# Patient Record
Sex: MALE | Race: WHITE | Employment: OTHER | URBAN - METROPOLITAN AREA
[De-identification: names, ages, dates, MRNs, and addresses within clinical notes are randomized per-mention and may not be internally consistent; named-entity substitution may affect disease eponyms.]

---

## 2017-09-22 ENCOUNTER — HOSPITAL ENCOUNTER (EMERGENCY)
Age: 77
Discharge: HOME | End: 2017-09-22
Attending: EMERGENCY MEDICINE
Payer: MEDICARE

## 2017-09-22 ENCOUNTER — APPOINTMENT (OUTPATIENT)
Dept: GENERAL RADIOLOGY | Age: 77
End: 2017-09-22
Payer: MEDICARE

## 2017-09-22 VITALS
OXYGEN SATURATION: 97 % | TEMPERATURE: 98.1 F | HEIGHT: 72 IN | BODY MASS INDEX: 24.46 KG/M2 | DIASTOLIC BLOOD PRESSURE: 92 MMHG | WEIGHT: 180.56 LBS | HEART RATE: 66 BPM | SYSTOLIC BLOOD PRESSURE: 155 MMHG | RESPIRATION RATE: 18 BRPM

## 2017-09-22 DIAGNOSIS — S81.801A LEG WOUND, RIGHT, INITIAL ENCOUNTER: Primary | ICD-10-CM

## 2017-09-22 PROCEDURE — 99283 EMERGENCY DEPT VISIT LOW MDM: CPT

## 2017-09-22 PROCEDURE — 74011250637 HC RX REV CODE- 250/637: Performed by: PHYSICIAN ASSISTANT

## 2017-09-22 PROCEDURE — 73590 X-RAY EXAM OF LOWER LEG: CPT

## 2017-09-22 RX ORDER — IBUPROFEN 400 MG/1
400 TABLET ORAL
Status: COMPLETED | OUTPATIENT
Start: 2017-09-22 | End: 2017-09-22

## 2017-09-22 RX ORDER — TAMSULOSIN HYDROCHLORIDE 0.4 MG/1
0.4 CAPSULE ORAL DAILY
COMMUNITY
End: 2022-06-16

## 2017-09-22 RX ADMIN — IBUPROFEN 400 MG: 400 TABLET, FILM COATED ORAL at 21:10

## 2017-09-23 NOTE — ED PROVIDER NOTES
HPI Comments: Trupti Dunn is a 78 y.o. male, pmhx significant for HTN, hiatal hernia, and glaucoma, who presents ambulatory with wife to the ED c/o right leg laceration sustained x 10:30am today. Pt was working with wood today when he dropped some on his leg and cut his right pre-tibial region. Pt washed the wound and sprayed antiseptic a couple hours after the accident once he arrived home. Pt states that he has normal sensation in his leg, and denies difficulty ambulating. [facility], and was referred to ED for ultrasound. Pt notes his last DTap was 3 years ago. Pt denies nausea, vomiting, hitting his head, LOC, headache, SOB, chest pain, or any other injuries. PCP: Eloy Moore MD    PSHx: Significant for orthopaedic (left TKR, cervical fusion, right foot surgery)  Social Hx: - tobacco, + EtOH, - Illicit Drugs    There are no other complaints, changes, or physical findings at this time. Trupti Dunn is a 78 y.o. male BIB wife for leg laceration. The history is provided by the patient. No  was used. Past Medical History:   Diagnosis Date    Gastrointestinal disorder     hiatal hernia    Glaucoma     Hypertension        Past Surgical History:   Procedure Laterality Date    HX ORTHOPAEDIC      left TKR    HX ORTHOPAEDIC      cervical fusion    HX ORTHOPAEDIC      right foot surgery         History reviewed. No pertinent family history. Social History     Social History    Marital status:      Spouse name: N/A    Number of children: N/A    Years of education: N/A     Occupational History    Not on file.      Social History Main Topics    Smoking status: Never Smoker    Smokeless tobacco: Never Used    Alcohol use Yes    Drug use: No    Sexual activity: Not on file     Other Topics Concern    Not on file     Social History Narrative         ALLERGIES: Review of patient's allergies indicates no known allergies. Review of Systems   Constitutional: Negative for activity change, appetite change, chills, fever and unexpected weight change. HENT: Negative for congestion. Eyes: Negative for pain and visual disturbance. Respiratory: Negative for cough. Gastrointestinal: Negative for diarrhea, nausea and vomiting. Genitourinary: Negative for dysuria. Musculoskeletal: Negative for back pain. Skin: Positive for wound. Negative for rash. Vitals:    09/22/17 2045   BP: (!) 155/92   Pulse: 66   Resp: 18   Temp: 98.1 °F (36.7 °C)   SpO2: 97%   Weight: 81.9 kg (180 lb 8.9 oz)   Height: 6' (1.829 m)            Physical Exam   Constitutional: He is oriented to person, place, and time. He appears well-developed and well-nourished. HENT:   Head: Normocephalic and atraumatic. Mouth/Throat: Oropharynx is clear and moist.   Eyes: Conjunctivae and EOM are normal. Pupils are equal, round, and reactive to light. Right eye exhibits no discharge. Left eye exhibits no discharge. Neck: Normal range of motion. Neck supple. Cardiovascular: Normal rate and normal heart sounds. No murmur heard. Pulmonary/Chest: Effort normal and breath sounds normal. No respiratory distress. He has no wheezes. He has no rales. Musculoskeletal: Normal range of motion. RLE mid pre-tibial region:  3cm horizontal abrasion slowly oozing blood without evidence of laceration with extensive pre-tibial edema and purplish skin discoloration  Normal TP and DP pulses with normal foot sensation, warmth, and coloration  No calf pain or swelling   Neurological: He is alert and oriented to person, place, and time. No cranial nerve deficit. He exhibits normal muscle tone. Skin: Skin is warm and dry. No rash noted. He is not diaphoretic. Nursing note and vitals reviewed.        MDM  Number of Diagnoses or Management Options  Leg wound, right, initial encounter:   Diagnosis management comments:   XR was without evidence of fracture, exam without evidence of compartment syndrome or DVT. Discussed strict return precautions, and home care management       Amount and/or Complexity of Data Reviewed  Tests in the radiology section of CPT®: ordered and reviewed  Review and summarize past medical records: yes    Patient Progress  Patient progress: stable    ED Course       Procedures    Progress Note:  10:44 PM  Discussed workup results/findings, and counseled pt regarding his diagnosis. Pt has been encouraged to follow-up as instructed. All questions have been answered, and pt conveyed understanding. Written by Monica Dodson ED Scribe, as dictated by Amada Long MD.    IMAGING RESULTS:  XR TIB/FIB RT   Final Result     EXAM:  XR TIB/FIB RT     INDICATION:  Trauma. Right lower leg pain     COMPARISON: None.     FINDINGS: AP and lateral  views of the right tibia and fibula demonstrate no  fracture.     IMPRESSION  IMPRESSION: No acute bony abnormality. MEDICATIONS GIVEN:  Medications   ibuprofen (MOTRIN) tablet 400 mg (400 mg Oral Given 9/22/17 2110)       IMPRESSION:  1. Leg wound, right, initial encounter        PLAN:  1. Discharge home. Discharge Medication List as of 9/22/2017 10:43 PM        2. Follow-up Information     Follow up With Details Comments Contact Info    [facility] EMERGENCY DEPT  If symptoms worsen with increased leg pain, numbness or skin color changes. [address] [place] [address]  [phone]        3. Return to ED if worse       DISCHARGE NOTE:  10:44 PM  Patient's results have been reviewed with them. Patient and/or family have verbally conveyed their understanding and agreement of the patient's signs, symptoms, diagnosis, treatment and prognosis and additionally agree to follow up as recommended or return to the Emergency Room should their condition change prior to follow-up.  Discharge instructions have also been provided to the patient with some educational information regarding their diagnosis as well a list of reasons why they would want to return to the ER prior to their follow-up appointment should their condition change. This note is prepared by Sweta Wilkins, acting as Scribe for MD Eduardo Birch MD: The scribe's documentation has been prepared under my direction and personally reviewed by me in its entirety. I confirm that the note above accurately reflects all work, treatment, procedures, and medical decision making performed by me.

## 2017-09-23 NOTE — ED NOTES
MD Bailey has reviewed discharge instructions with the patient. The patient verbalized understanding. Pt discharged with written instructions. No further concerns at this time. Pt ambulatory to exit with steady gait and ice pack provided.

## 2017-09-23 NOTE — ED NOTES
Pt arrives to the ED, referred from [facility] for possible compartment syndrome. Pt reports using log splitter, can not recall exact events. Log either bounced from ground or off splitter and hit right lower leg/shin. Pt ambulatory to room with steady gait. RLE: Pedal pulse present. Swelling centralized around middle of shin. Wife at bedside and call bell within reach.

## 2017-09-23 NOTE — DISCHARGE INSTRUCTIONS
Please stay off your leg with decreased activity over the weekend. You can ice the swelling for 20 minutes every a time every hour as needed for swelling. Cuts Left Open: Care Instructions  Your Care Instructions    A cut can happen anywhere on your body. Sometimes a cut can injure the tendons, blood vessels, or nerves. A cut may be left open instead of being closed with stitches, staples, or adhesive. A cut may be left open when it is likely to become infected, because closing it can make infection even more likely. You will probably have a bandage. The doctor may want the cut to stay open the whole time it heals. This happens with some cuts when too much time has gone by since the cut happened. Or the doctor may tell you to come back to have the cut closed in 4 to 5 days, when there is less chance of infection. If the cut stays open while healing, your scar may be larger than if the cut was closed. But you can get treatment later to make the scar smaller. The doctor has checked you carefully, but problems can develop later. If you notice any problems or new symptoms, get medical treatment right away. Follow-up care is a key part of your treatment and safety. Be sure to make and go to all appointments, and call your doctor if you are having problems. It's also a good idea to know your test results and keep a list of the medicines you take. How can you care for yourself at home? · Keep the cut dry for the first 24 to 48 hours. After this, you can shower if your doctor okays it. Pat the cut dry. · Don't soak the cut, such as in a bathtub. Your doctor will tell you when it's safe to get the cut wet. · If your doctor told you how to care for your cut, follow your doctor's instructions. If you did not get instructions, follow this general advice:  ¨ After the first 24 to 48 hours, wash the cut with clean water 2 times a day. Don't use hydrogen peroxide or alcohol, which can slow healing.   ¨ You may cover the cut with a thin layer of petroleum jelly, such as Vaseline, and a nonstick bandage. ¨ Apply more petroleum jelly and replace the bandage as needed. · Prop up the injured area on a pillow anytime you sit or lie down during the next 3 days. Try to keep it above the level of your heart. This will help reduce swelling. · Avoid any activity that could cause your cut to get worse. · Take pain medicines exactly as directed. ¨ If the doctor gave you a prescription medicine for pain, take it as prescribed. ¨ If you are not taking a prescription pain medicine, ask your doctor if you can take an over-the-counter medicine. When should you call for help? Call your doctor now or seek immediate medical care if:  · You have new pain, or your pain gets worse. · The cut starts to bleed, and blood soaks through the bandage. Oozing small amounts of blood is normal.  · The skin near the cut is cold or pale or changes color. · You have tingling, weakness, or numbness near the cut. · You have trouble moving the area near the cut. · You have symptoms of infection, such as:  ¨ Increased pain, swelling, warmth, or redness around the cut. ¨ Red streaks leading from the cut. ¨ Pus draining from the cut. ¨ A fever. Watch closely for changes in your health, and be sure to contact your doctor if:  · The cut is not closing (getting smaller). · You do not get better as expected. Where can you learn more? Go to http://juju-isabel.info/. Enter 20-23-41-52 in the search box to learn more about \"Cuts Left Open: Care Instructions. \"  Current as of: March 20, 2017  Content Version: 11.3  © 6686-9609 [employer]. Care instructions adapted under license by [employer] (which disclaims liability or warranty for this information).  If you have questions about a medical condition or this instruction, always ask your healthcare professional. [employer] disclaims any warranty or liability for your use of this information.

## 2019-04-20 ENCOUNTER — HOSPITAL ENCOUNTER (EMERGENCY)
Age: 79
Discharge: HOME | End: 2019-04-20
Attending: EMERGENCY MEDICINE | Admitting: EMERGENCY MEDICINE
Payer: MEDICARE

## 2019-04-20 VITALS
RESPIRATION RATE: 16 BRPM | OXYGEN SATURATION: 97 % | BODY MASS INDEX: 25.74 KG/M2 | DIASTOLIC BLOOD PRESSURE: 95 MMHG | HEIGHT: 72 IN | HEART RATE: 99 BPM | WEIGHT: 190.04 LBS | TEMPERATURE: 97.5 F | SYSTOLIC BLOOD PRESSURE: 145 MMHG

## 2019-04-20 DIAGNOSIS — R33.9 URINARY RETENTION: Primary | ICD-10-CM

## 2019-04-20 LAB
ALBUMIN SERPL-MCNC: 3.5 G/DL (ref 3.5–5)
ALBUMIN/GLOB SERPL: 1.3 {RATIO} (ref 1.1–2.2)
ALP SERPL-CCNC: 69 U/L (ref 45–117)
ALT SERPL-CCNC: 19 U/L (ref 12–78)
ANION GAP SERPL CALC-SCNC: 4 MMOL/L (ref 5–15)
APPEARANCE UR: CLEAR
AST SERPL-CCNC: 24 U/L (ref 15–37)
BACTERIA URNS QL MICRO: NEGATIVE /HPF
BASOPHILS # BLD: 0 K/UL (ref 0–0.1)
BASOPHILS NFR BLD: 0 % (ref 0–1)
BILIRUB SERPL-MCNC: 1 MG/DL (ref 0.2–1)
BILIRUB UR QL: NEGATIVE
BUN SERPL-MCNC: 17 MG/DL (ref 6–20)
BUN/CREAT SERPL: 17 (ref 12–20)
CALCIUM SERPL-MCNC: 8.7 MG/DL (ref 8.5–10.1)
CHLORIDE SERPL-SCNC: 106 MMOL/L (ref 97–108)
CO2 SERPL-SCNC: 28 MMOL/L (ref 21–32)
COLOR UR: ABNORMAL
CREAT SERPL-MCNC: 1.01 MG/DL (ref 0.7–1.3)
DIFFERENTIAL METHOD BLD: ABNORMAL
EOSINOPHIL # BLD: 0 K/UL (ref 0–0.4)
EOSINOPHIL NFR BLD: 0 % (ref 0–7)
EPITH CASTS URNS QL MICRO: ABNORMAL /LPF
ERYTHROCYTE [DISTWIDTH] IN BLOOD BY AUTOMATED COUNT: 14.1 % (ref 11.5–14.5)
GLOBULIN SER CALC-MCNC: 2.8 G/DL (ref 2–4)
GLUCOSE SERPL-MCNC: 118 MG/DL (ref 65–100)
GLUCOSE UR STRIP.AUTO-MCNC: NEGATIVE MG/DL
HCT VFR BLD AUTO: 35.7 % (ref 36.6–50.3)
HGB BLD-MCNC: 12.4 G/DL (ref 12.1–17)
HGB UR QL STRIP: ABNORMAL
HYALINE CASTS URNS QL MICRO: ABNORMAL /LPF (ref 0–5)
IMM GRANULOCYTES # BLD AUTO: 0 K/UL (ref 0–0.04)
IMM GRANULOCYTES NFR BLD AUTO: 0 % (ref 0–0.5)
KETONES UR QL STRIP.AUTO: NEGATIVE MG/DL
LEUKOCYTE ESTERASE UR QL STRIP.AUTO: NEGATIVE
LYMPHOCYTES # BLD: 0.4 K/UL (ref 0.8–3.5)
LYMPHOCYTES NFR BLD: 4 % (ref 12–49)
MCH RBC QN AUTO: 34.1 PG (ref 26–34)
MCHC RBC AUTO-ENTMCNC: 34.7 G/DL (ref 30–36.5)
MCV RBC AUTO: 98.1 FL (ref 80–99)
MONOCYTES # BLD: 1.7 K/UL (ref 0–1)
MONOCYTES NFR BLD: 15 % (ref 5–13)
NEUTS SEG # BLD: 9 K/UL (ref 1.8–8)
NEUTS SEG NFR BLD: 81 % (ref 32–75)
NITRITE UR QL STRIP.AUTO: NEGATIVE
NRBC # BLD: 0 K/UL (ref 0–0.01)
NRBC BLD-RTO: 0 PER 100 WBC
PH UR STRIP: 6 [PH] (ref 5–8)
PLATELET # BLD AUTO: 225 K/UL (ref 150–400)
PMV BLD AUTO: 9.7 FL (ref 8.9–12.9)
POTASSIUM SERPL-SCNC: 4.2 MMOL/L (ref 3.5–5.1)
PROT SERPL-MCNC: 6.3 G/DL (ref 6.4–8.2)
PROT UR STRIP-MCNC: NEGATIVE MG/DL
RBC # BLD AUTO: 3.64 M/UL (ref 4.1–5.7)
RBC #/AREA URNS HPF: ABNORMAL /HPF (ref 0–5)
RBC MORPH BLD: ABNORMAL
SODIUM SERPL-SCNC: 138 MMOL/L (ref 136–145)
SP GR UR REFRACTOMETRY: 1.01 (ref 1–1.03)
UA: UC IF INDICATED,UAUC: ABNORMAL
UROBILINOGEN UR QL STRIP.AUTO: 0.2 EU/DL (ref 0.2–1)
WBC # BLD AUTO: 11.1 K/UL (ref 4.1–11.1)
WBC URNS QL MICRO: ABNORMAL /HPF (ref 0–4)

## 2019-04-20 PROCEDURE — 85025 COMPLETE CBC W/AUTO DIFF WBC: CPT

## 2019-04-20 PROCEDURE — 77030005514 HC CATH URETH FOL14 BARD -A

## 2019-04-20 PROCEDURE — 36415 COLL VENOUS BLD VENIPUNCTURE: CPT

## 2019-04-20 PROCEDURE — 99282 EMERGENCY DEPT VISIT SF MDM: CPT

## 2019-04-20 PROCEDURE — 74011000250 HC RX REV CODE- 250: Performed by: EMERGENCY MEDICINE

## 2019-04-20 PROCEDURE — 81001 URINALYSIS AUTO W/SCOPE: CPT

## 2019-04-20 PROCEDURE — 80053 COMPREHEN METABOLIC PANEL: CPT

## 2019-04-20 PROCEDURE — 51702 INSERT TEMP BLADDER CATH: CPT

## 2019-04-20 RX ORDER — PILOCARPINE HYDROCHLORIDE 20 MG/ML
1 SOLUTION/ DROPS OPHTHALMIC 4 TIMES DAILY
COMMUNITY

## 2019-04-20 RX ORDER — LIDOCAINE HYDROCHLORIDE 20 MG/ML
JELLY TOPICAL
Status: COMPLETED | OUTPATIENT
Start: 2019-04-20 | End: 2019-04-20

## 2019-04-20 RX ORDER — LOTEPREDNOL ETABONATE 5 MG/ML
1 SUSPENSION/ DROPS OPHTHALMIC 4 TIMES DAILY
COMMUNITY

## 2019-04-20 RX ADMIN — LIDOCAINE HYDROCHLORIDE: 20 JELLY TOPICAL at 03:04

## 2019-04-20 NOTE — ED TRIAGE NOTES
Pt arrived with wife today. Pt having urinary retention post surgery today. Pt stated he put out 500cc in the recovery room. Pt has self-cathed twice since being home with little output. Pt reports having blood during self-cath.

## 2019-04-20 NOTE — ED NOTES
Pt had 1300cc of urine output in hayes. Pt reports feeling better. Hayes changed to leg bag. Pt and wife received instructions to usage.

## 2019-04-20 NOTE — DISCHARGE INSTRUCTIONS
Patient Education        Urinary Retention: Care Instructions  Your Care Instructions    Urinary retention means that you aren't able to urinate. In men, it is often caused by a blockage of the urinary tract from an enlarged prostate gland. In men and women, it can also be caused by an infection or nerve damage. Or it may be a side effect of a medicine. The doctor may have drained the urine from your bladder. If you still have problems passing urine, you may need to use a catheter at home. This is used to empty your bladder until the problem can be fixed. Your doctor may put a catheter in your bladder before you go home. If so, he or she will tell you when to come back to have the catheter removed. The doctor has checked you closely. But problems can develop later. If you notice any problems or new symptoms, get medical treatment right away. Follow-up care is a key part of your treatment and safety. Be sure to make and go to all appointments, and call your doctor if you are having problems. It's also a good idea to know your test results and keep a list of the medicines you take. How can you care for yourself at home? · Take your medicines exactly as prescribed. Call your doctor if you think you are having a problem with your medicine. You will get more details on the specific medicines your doctor prescribes. · Check with your doctor before you use any over-the-counter medicines. Many cold and allergy medicines, for example, can make this problem worse. Make sure your doctor knows all of the medicines, vitamins, supplements, and herbal remedies you take. · Spread out through the day the amount of fluid you drink. Do not drink a lot at bedtime. · Avoid alcohol and caffeine. · If you have been given a catheter, or if one is already in place, follow the instructions you were given. Always wash your hands before and after you handle the catheter. When should you call for help?   Call your doctor now or seek immediate medical care if:    · You cannot urinate at all, or it is getting harder to urinate.     · You have symptoms of a urinary tract infection. These may include:  ? Pain or burning when you urinate. ? A frequent need to urinate without being able to pass much urine. ? Pain in the flank, which is just below the rib cage and above the waist on either side of the back. ? Blood in your urine. ? A fever.    Watch closely for changes in your health, and be sure to contact your doctor if:    · You have any problems with your catheter.     · You do not get better as expected. Where can you learn more? Go to http://juju-isabel.info/. Enter M244 in the search box to learn more about \"Urinary Retention: Care Instructions. \"  Current as of: March 20, 2018  Content Version: 11.9  © 1215-0281 [employer]. Care instructions adapted under license by [employer] (which disclaims liability or warranty for this information). If you have questions about a medical condition or this instruction, always ask your healthcare professional. [place] [address] any warranty or liability for your use of this information. Learning About How to Care for a Person's Indwelling Urinary Catheter  Introduction    A urinary catheter is a flexible plastic tube used to drain urine from the bladder when a person cannot urinate. A doctor will place the catheter into the bladder by inserting it through the urethra. The urethra is the opening that carries urine from the bladder to the outside of the body. When the catheter is in the bladder, a small balloon is used to keep the catheter in place. The catheter lets urine drain from the bladder into a bag. The bag is usually attached to the thigh. Urinary catheters can be used in both men and women. A catheter that stays in place for a longer period of time is called an indwelling catheter.   A catheter may be needed because of certain medical conditions. These include an enlarged prostate or problems controlling urine. It may be used after surgery on the pelvis or urinary tract. Urinary catheters are also used when the lower part of the body is paralyzed. When helping a loved one with a catheter, try to be relaxed. Caring for a catheter can be embarrassing for both of you. This may be especially true if you are caring for someone of the opposite sex. If you are calm and don't seem embarrassed, the person may feel more comfortable. How do you take care of the catheter? Wear disposable gloves when handling someone's catheter. Make sure to follow all of the instructions the doctor has given. And always wash your hands before and after you're done. Here are some other things to remember when caring for someone's catheter:  · Make sure that urine is running out of the catheter into the urine collection bag. And make sure that the catheter tubing does not get twisted or bent. · Keep the urine collection bag below the level of the bladder. At night it may be helpful to hang the bag on the side of the bed. · Make sure that the urine collection bag does not drag and pull on the catheter. · It is okay to shower with a catheter and urine collection bag in place, unless the doctor says not to. · Check for swelling or signs of infection in the area around the catheter. Signs of infection include pus or irritated, swollen, red, or tender skin. · Clean the area around the catheter twice a day with soap and water. Dry with a clean towel afterward. · Do not apply powder or lotion to the skin around the catheter. · Do not tug or pull on the catheter. · Sexual intercourse may still be possible for individuals who wear a catheter. It is best to talk with a doctor about options. How do you empty the bag? The urine collection bag needs to be emptied regularly. It is best to empty the bag when it's about half full or at bedtime.  If the doctor has asked you to measure the amount of urine, do that before you empty the urine into the toilet. When you are ready to empty the bag, follow these steps:  1. Put on disposable gloves. 2. Remove the drain spout from its sleeve at the bottom of the collection bag. Open the valve on the spout. 3. Let the urine flow out of the bag and into the toilet or a container. Do not let the tubing or drain spout touch anything. 4. After you empty the bag, close the valve and put the drain spout back into its sleeve. 5. Remove your gloves and throw them away. 6. Wash your hands with soap and water. How do you care for someone after the catheter is removed? After the catheter is taken out, the person may have trouble urinating. If this happens, try helping them sit in a few inches of warm water (sitz bath). If the urge to urinate comes during the sitz bath, it may be easier for them to urinate while still in the bath. Some burning may happen the first few times the person urinates. If the burning lasts longer, it may be a sign of an infection. If the catheter causes irritation or a rash, wearing loose, cotton underwear may help. Watch closely for changes in the person's health, and be sure to contact their doctor if you notice any problems. Where can you learn more? Go to http://juju-isabel.info/. Enter U479 in the search box to learn more about \"Learning About How to Care for a Person's Indwelling Urinary Catheter. \"  Current as of: April 18, 2018  Content Version: 11.9  © 0811-7029 [employer]. Care instructions adapted under license by [employer] (which disclaims liability or warranty for this information).  If you have questions about a medical condition or this instruction, always ask your healthcare professional. [address] any warranty or liability for your use of this information.

## 2019-04-20 NOTE — ED NOTES
Patient discharged by America Blanchard MD. Patient provided with discharge instructions Rx and instructions on follow up care. Patient out of ED ambulatory accompanied by wife.

## 2019-04-20 NOTE — ED PROVIDER NOTES
EMERGENCY DEPARTMENT HISTORY AND PHYSICAL EXAM  
     
 
Date: 4/20/2019 Patient Name: Dinah Schneider History of Presenting Illness Chief Complaint Patient presents with  Urinary Retention  
  urinated 200 mL approx 2200; had sx at [facility] yesterday; has had retention in the past  
 
 
History Provided By: Patient and wife HPI: Dinah Schneider is a 70 y.o. male, pmhx of intermittent urinary retention, enlarged prostate, who presents ambulatory to the ED with c/o urinary retention since 10pm. Pt notes he attempted to self cath at home with little to no results on 2 attempts. Patient notes diffuse lower abdominal distention, pressure and pain. Patient denies having any fever, nausea/vomiting/diarrhea/constipation chest pain/shortness of breath. Patient is status post left rotator cuff repair from yesterday morning. Patient noted to have regional block per anesthesia for the procedure. Patient followed by Dr. Donell Fregoso of [facility]. Pt specifically denies any recent fevers, chills, nausea, vomiting, diarrhea, CP, SOB, changes in BM, or headache. PCP: Zoila Chandra MD 
 
Social Hx: denies tobacco, denies EtOH, denies Illicit Drugs There are no other complaints, changes, or physical findings at this time. No Known Allergies Current Outpatient Medications Medication Sig Dispense Refill  loteprednol etabonate (LOTEMAX) 0.5 % ophthalmic suspension Administer 1 Drop to both eyes four (4) times daily.  pilocarpine (PILOCAR) 2 % ophthalmic solution Administer 1 Drop to both eyes four (4) times daily.  tamsulosin (FLOMAX) 0.4 mg capsule Take 0.4 mg by mouth daily.  lisinopril (PRINIVIL, ZESTRIL) 40 mg tablet Take 40 mg by mouth daily.  dutaseride (AVODART) 0.5 mg capsule Take 0.5 mg by mouth daily.  BRIMONIDINE TARTRATE (ALPHAGAN P OP) Apply  to eye.  doxepin (SINEQUAN) 10 mg capsule Take  by mouth nightly. Past History Past Medical History: 
Past Medical History:  
Diagnosis Date  Gastrointestinal disorder   
 hiatal hernia  Glaucoma  Hypertension Past Surgical History: 
Past Surgical History:  
Procedure Laterality Date  HX ORTHOPAEDIC    
 left TKR  HX ORTHOPAEDIC    
 cervical fusion  HX ORTHOPAEDIC    
 right foot surgery Family History: 
History reviewed. No pertinent family history. Social History: 
Social History Tobacco Use  Smoking status: Never Smoker  Smokeless tobacco: Never Used Substance Use Topics  Alcohol use: Yes  Drug use: No  
 
 
Allergies: 
No Known Allergies Review of Systems Review of Systems Constitutional: Negative for chills and fever. HENT: Negative. Eyes: Negative. Respiratory: Negative for cough, chest tightness and shortness of breath. Cardiovascular: Negative for chest pain and leg swelling. Gastrointestinal: Positive for abdominal pain. Negative for diarrhea, nausea and vomiting. Endocrine: Negative. Genitourinary: Positive for decreased urine volume, difficulty urinating and dysuria. Musculoskeletal: Negative for myalgias. Skin: Negative. Neurological: Negative. Psychiatric/Behavioral: Negative. All other systems reviewed and are negative. Physical Exam  
Physical Exam  
Constitutional: He is oriented to person, place, and time. He appears well-developed and well-nourished. He appears distressed. HENT:  
Head: Normocephalic and atraumatic. Nose: Nose normal.  
Mouth/Throat: No oropharyngeal exudate. Eyes: Pupils are equal, round, and reactive to light. Conjunctivae and EOM are normal.  
Neck: Normal range of motion. Neck supple. No JVD present. Cardiovascular: Normal rate, regular rhythm, normal heart sounds and intact distal pulses. Exam reveals no friction rub. No murmur heard. Pulmonary/Chest: Effort normal and breath sounds normal. No stridor.  No respiratory distress. He has no wheezes. He has no rales. Abdominal: Soft. Bowel sounds are normal. He exhibits no distension. There is tenderness in the suprapubic area. There is no rigidity, no rebound and no guarding. Musculoskeletal: Normal range of motion. He exhibits no tenderness. Neurological: He is alert and oriented to person, place, and time. No cranial nerve deficit. Skin: Skin is warm and dry. No rash noted. He is not diaphoretic. Psychiatric: He has a normal mood and affect. His speech is normal and behavior is normal. Judgment and thought content normal. Cognition and memory are normal.  
Nursing note and vitals reviewed. Diagnostic Study Results Labs - Recent Results (from the past 12 hour(s)) URINALYSIS W/ REFLEX CULTURE Collection Time: 04/20/19  3:35 AM  
Result Value Ref Range Color YELLOW/STRAW Appearance CLEAR CLEAR Specific gravity 1.013 1.003 - 1.030    
 pH (UA) 6.0 5.0 - 8.0 Protein NEGATIVE  NEG mg/dL Glucose NEGATIVE  NEG mg/dL Ketone NEGATIVE  NEG mg/dL Bilirubin NEGATIVE  NEG Blood MODERATE (A) NEG Urobilinogen 0.2 0.2 - 1.0 EU/dL Nitrites NEGATIVE  NEG Leukocyte Esterase NEGATIVE  NEG    
 WBC 0-4 0 - 4 /hpf  
 RBC 20-50 0 - 5 /hpf Epithelial cells FEW FEW /lpf Bacteria NEGATIVE  NEG /hpf  
 UA:UC IF INDICATED CULTURE NOT INDICATED BY UA RESULT CNI Hyaline cast 0-2 0 - 5 /lpf METABOLIC PANEL, COMPREHENSIVE Collection Time: 04/20/19  3:35 AM  
Result Value Ref Range Sodium 138 136 - 145 mmol/L Potassium 4.2 3.5 - 5.1 mmol/L Chloride 106 97 - 108 mmol/L  
 CO2 28 21 - 32 mmol/L Anion gap 4 (L) 5 - 15 mmol/L Glucose 118 (H) 65 - 100 mg/dL BUN 17 6 - 20 MG/DL Creatinine 1.01 0.70 - 1.30 MG/DL  
 BUN/Creatinine ratio 17 12 - 20 GFR est AA >60 >60 ml/min/1.73m2 GFR est non-AA >60 >60 ml/min/1.73m2 Calcium 8.7 8.5 - 10.1 MG/DL  Bilirubin, total 1.0 0.2 - 1.0 MG/DL  
 ALT (SGPT) 19 12 - 78 U/L  
 AST (SGOT) 24 15 - 37 U/L Alk. phosphatase 69 45 - 117 U/L Protein, total 6.3 (L) 6.4 - 8.2 g/dL Albumin 3.5 3.5 - 5.0 g/dL Globulin 2.8 2.0 - 4.0 g/dL A-G Ratio 1.3 1.1 - 2.2    
CBC WITH AUTOMATED DIFF Collection Time: 04/20/19  3:35 AM  
Result Value Ref Range WBC 11.1 4.1 - 11.1 K/uL  
 RBC 3.64 (L) 4.10 - 5.70 M/uL  
 HGB 12.4 12.1 - 17.0 g/dL HCT 35.7 (L) 36.6 - 50.3 % MCV 98.1 80.0 - 99.0 FL  
 MCH 34.1 (H) 26.0 - 34.0 PG  
 MCHC 34.7 30.0 - 36.5 g/dL  
 RDW 14.1 11.5 - 14.5 % PLATELET 743 540 - 944 K/uL MPV 9.7 8.9 - 12.9 FL  
 NRBC 0.0 0  WBC ABSOLUTE NRBC 0.00 0.00 - 0.01 K/uL NEUTROPHILS 81 (H) 32 - 75 % LYMPHOCYTES 4 (L) 12 - 49 % MONOCYTES 15 (H) 5 - 13 % EOSINOPHILS 0 0 - 7 % BASOPHILS 0 0 - 1 % IMMATURE GRANULOCYTES 0 0.0 - 0.5 % ABS. NEUTROPHILS 9.0 (H) 1.8 - 8.0 K/UL  
 ABS. LYMPHOCYTES 0.4 (L) 0.8 - 3.5 K/UL  
 ABS. MONOCYTES 1.7 (H) 0.0 - 1.0 K/UL  
 ABS. EOSINOPHILS 0.0 0.0 - 0.4 K/UL  
 ABS. BASOPHILS 0.0 0.0 - 0.1 K/UL  
 ABS. IMM. GRANS. 0.0 0.00 - 0.04 K/UL  
 DF SMEAR SCANNED    
 RBC COMMENTS NORMOCYTIC, NORMOCHROMIC Radiologic Studies - No orders to display CT Results  (Last 48 hours) None CXR Results  (Last 48 hours) None Medical Decision Making I am the first provider for this patient. I reviewed the vital signs, available nursing notes, past medical history, past surgical history, family history and social history. Vital Signs-Reviewed the patient's vital signs. Patient Vitals for the past 12 hrs: 
 Temp Pulse Resp BP SpO2  
04/20/19 0235 97.5 °F (36.4 °C) 99 16 (!) 145/95 97 % Pulse Oximetry Analysis - 97% on RA Records Reviewed: Nursing Notes and Old Medical Records Provider Notes (Medical Decision Making): DDX: 
Urinary retention, enlarged prostate, UTI, renal insufficiency Plan: 
Bladder scan, UA, Hughes placement Impression: 
Urinary obstruction and retention ED Course:  
Initial assessment performed. The patients presenting problems have been discussed, and they are in agreement with the care plan formulated and outlined with them. I have encouraged them to ask questions as they arise throughout their visit. I reviewed our electronic medical record system for any past medical records that were available that may contribute to the patients current condition, the nursing notes and and vital signs from today's visit Nursing notes will be reviewed as they become available in realtime while the pt has been in the ED. Reena Hickman MD 
 
 
PROGRESS NOTE 
4:02 AM 
Pt noted to have 900+ cc in his bladder at time of initial bladder scan. Nursing has placed indwelling Hughes and noted to have initial output of 700 cc. We will continue to allow drainage and lab results to ensure kidney function intact. 8013 Progress note: 
Pt noted to be feeling better after draining more than 1L of urine, feels ready for discharge. Discussed lab findings with pt and/or family, specifically noting normal labs. Pt will follow up with Dr. Jean Newby as instructed. All questions have been answered, pt voiced understanding and agreement with plan. If narcotics were prescribed, pt's  record was reviewed and pt was advised not to drive or operate heavy machinery. If abx were prescribed, pt advised that diarrhea and rash are possible side effects of the medications. Specific return precautions provided in addition to instructions for pt to return to the ED immediately should sx worsen at any time. Reena Hickman MD 
 
 
Critical Care Time:  
 
none Diagnosis Clinical Impression: 1. Urinary retention PLAN: 
1. Discharge Medication List as of 4/20/2019  4:34 AM  
  
 
2. Follow-up Information Follow up With Specialties Details Why Contact Info Sophia Devi MD Urology Schedule an appointment as soon as possible for a visit in 2 days  [address] [facility] [place] 
[phone] Return to ED if worse Disposition: 
[id number] The patient's results have been reviewed with family and/or caregiver. They verbally convey their understanding and agreement of the patient's signs, symptoms, diagnosis, treatment and prognosis and additionally agree to follow up as recommended in the discharge instructions or to return to the Emergency Room should the patient's condition change prior to their follow-up appointment. The family and/or caregiver verbally agrees with the care-plan and all of their questions have been answered. The discharge instructions have also been provided to the them with educational information regarding the patient's diagnosis as well a list of reasons why the patient would want to return to the ER prior to their follow-up appointment should their condition change. Isamar Rowland MD 
 
 
 
 
 
Please note that this dictation was completed with TrialBee, the computer voice recognition software. Quite often unanticipated grammatical, syntax, homophones, and other interpretive errors are inadvertently transcribed by the computer software. Please disregard these errors. Please excuse any errors that have escaped final proofreading. This note will not be viewable in [address]

## 2020-04-10 ENCOUNTER — HOSPITAL ENCOUNTER (OUTPATIENT)
Dept: CT IMAGING | Age: 80
Discharge: HOME | End: 2020-04-10
Attending: SPECIALIST
Payer: MEDICARE

## 2020-04-10 DIAGNOSIS — R19.4 CHANGE IN BOWEL HABITS: ICD-10-CM

## 2020-04-10 DIAGNOSIS — R10.31 RIGHT LOWER QUADRANT PAIN: ICD-10-CM

## 2020-04-10 DIAGNOSIS — R10.32 LEFT LOWER QUADRANT PAIN: ICD-10-CM

## 2020-04-10 LAB — CREAT BLD-MCNC: 1.1 MG/DL (ref 0.6–1.3)

## 2020-04-10 PROCEDURE — 82565 ASSAY OF CREATININE: CPT

## 2020-04-10 PROCEDURE — 74011000255 HC RX REV CODE- 255: Performed by: SPECIALIST

## 2020-04-10 PROCEDURE — 74011636320 HC RX REV CODE- 636/320: Performed by: SPECIALIST

## 2020-04-10 PROCEDURE — 74177 CT ABD & PELVIS W/CONTRAST: CPT

## 2020-04-10 RX ORDER — BARIUM SULFATE 20 MG/ML
900 SUSPENSION ORAL
Status: COMPLETED | OUTPATIENT
Start: 2020-04-10 | End: 2020-04-10

## 2020-04-10 RX ORDER — SODIUM CHLORIDE 0.9 % (FLUSH) 0.9 %
10 SYRINGE (ML) INJECTION
Status: COMPLETED | OUTPATIENT
Start: 2020-04-10 | End: 2020-04-10

## 2020-04-10 RX ADMIN — BARIUM SULFATE 900 ML: 21 SUSPENSION ORAL at 07:50

## 2020-04-10 RX ADMIN — Medication 10 ML: at 07:50

## 2020-04-10 RX ADMIN — IOPAMIDOL 100 ML: 755 INJECTION, SOLUTION INTRAVENOUS at 07:50

## 2020-04-18 ENCOUNTER — HOSPITAL ENCOUNTER (EMERGENCY)
Age: 80
Discharge: HOME | End: 2020-04-18
Attending: EMERGENCY MEDICINE | Admitting: EMERGENCY MEDICINE
Payer: MEDICARE

## 2020-04-18 ENCOUNTER — APPOINTMENT (OUTPATIENT)
Dept: GENERAL RADIOLOGY | Age: 80
End: 2020-04-18
Attending: EMERGENCY MEDICINE
Payer: MEDICARE

## 2020-04-18 VITALS
DIASTOLIC BLOOD PRESSURE: 80 MMHG | OXYGEN SATURATION: 96 % | HEART RATE: 79 BPM | RESPIRATION RATE: 18 BRPM | TEMPERATURE: 98.7 F | SYSTOLIC BLOOD PRESSURE: 157 MMHG

## 2020-04-18 DIAGNOSIS — S42.125A CLOSED NONDISPLACED FRACTURE OF LEFT ACROMIAL PROCESS, INITIAL ENCOUNTER: Primary | ICD-10-CM

## 2020-04-18 PROCEDURE — 74011250637 HC RX REV CODE- 250/637: Performed by: EMERGENCY MEDICINE

## 2020-04-18 PROCEDURE — 73030 X-RAY EXAM OF SHOULDER: CPT

## 2020-04-18 PROCEDURE — 99283 EMERGENCY DEPT VISIT LOW MDM: CPT

## 2020-04-18 RX ORDER — HYDROCODONE BITARTRATE AND ACETAMINOPHEN 5; 325 MG/1; MG/1
1 TABLET ORAL ONCE
Status: COMPLETED | OUTPATIENT
Start: 2020-04-18 | End: 2020-04-18

## 2020-04-18 RX ORDER — HYDROCODONE BITARTRATE AND ACETAMINOPHEN 5; 325 MG/1; MG/1
1 TABLET ORAL
Qty: 8 TAB | Refills: 0 | Status: SHIPPED | OUTPATIENT
Start: 2020-04-18 | End: 2020-04-21

## 2020-04-18 RX ADMIN — HYDROCODONE BITARTRATE AND ACETAMINOPHEN 1 TABLET: 5; 325 TABLET ORAL at 16:07

## 2020-04-18 NOTE — ED NOTES
Pt wishes to use sling he brought from home. RN obtained sling from wife in parking lot and applied to pt's L shoulder and arm.

## 2020-04-18 NOTE — ED NOTES
Liliya SNOW reviewed discharge instructions with the patient and wife in parking lot. The patient and wife verbalized understanding. All questions and concerns were addressed. The patient declined a wheelchair and is discharged ambulatory in the care of family members with instructions and prescriptions in hand. Pt is alert and oriented x 4. Respirations are clear and unlabored.

## 2020-04-18 NOTE — DISCHARGE INSTRUCTIONS
Patient Education        Shoulder Blade Fracture: Care Instructions  Your Care Instructions    The shoulder blade (scapula) is a triangle-shaped bone in the upper back. It connects to the upper arm bone (humerus) and to the collar bone (clavicle). Shoulder blade fractures are usually caused by high-impact accidents such as motorcycle or car crashes or very hard falls. Treatment for these fractures is usually a sling or other device that supports the shoulder while the bone heals. Most fractures heal completely in about six weeks, but it can take six months to a year for your shoulder motion to return to normal. Sometimes, full motion doesn't return. Some types of shoulder blade fractures may need surgery. The doctor has checked you carefully, but problems can develop later. If you notice any problems or new symptoms, get medical treatment right away. Follow-up care is a key part of your treatment and safety. Be sure to make and go to all appointments, and call your doctor if you are having problems. It's also a good idea to know your test results and keep a list of the medicines you take. How can you care for yourself at home? · Wear the sling for as long as your doctor tells you to. You may take off the sling when you bathe. When the sling is off, avoid arm positions or motions that cause or increase pain. · Put ice or a cold pack on your shoulder for 10 to 20 minutes at a time. Try to do this every 1 to 2 hours for the next 3 days (when you are awake) or until the swelling goes down. Put a thin cloth between the ice and your skin. · After a few days, put your fingers, wrist, and elbow through their full range of motion several times a day. This will keep them from getting stiff. You may get instructions on rehabilitation exercises you can do when your shoulder starts to heal.  · Be safe with medicines. Read and follow all instructions on the label.   ? If the doctor gave you a prescription medicine for pain, take it as prescribed. ? If you are not taking a prescription pain medicine, ask your doctor if you can take an over-the-counter medicine. When should you call for help? Call 911 anytime you think you may need emergency care. For example, call if:    · You are having severe trouble breathing.     · You have chest pain.    Call your doctor now or seek immediate medical care if:    · You have new or worse symptoms in your arm. Symptoms may include:  ? Numbness or tingling. ? Weakness. ? Pain. ? New or worse swelling.    Watch closely for changes in your health, and be sure to contact your doctor if:    · You do not get better as expected. Where can you learn more? Go to http://juju-isabel.info/  Enter D119 in the search box to learn more about \"Shoulder Blade Fracture: Care Instructions. \"  Current as of: June 26, 2019Content Version: 12.4  © 6949-5266 Healthwise, Incorporated. Care instructions adapted under license by [employer] (which disclaims liability or warranty for this information). If you have questions about a medical condition or this instruction, always ask your healthcare professional. [place] [address] any warranty or liability for your use of this information.

## 2020-04-18 NOTE — ED PROVIDER NOTES
EMERGENCY DEPARTMENT HISTORY AND PHYSICAL EXAM      Date: 4/18/2020  Patient Name: Kvng Croft    History of Presenting Illness     Chief Complaint   Patient presents with    Shoulder Pain     The patient presents to the ED with complaints of left shoulder pain after falling while chopping wood. History Provided By: Patient    HPI: Kvng Croft, 68 y.o. male with PMHx significant for hypertension who presents with a chief complaint of left shoulder pain. Patient states that he was cutting wood with a chainsaw and stepped on a piece causing him to lose his balance and fall backwards landing directly on the back of his left shoulder. States he had immediate pain in that area. Denies any numbness or tingling distal to the injury and denies any weakness in the hand or wrist.  Denies loss of consciousness, headache, nausea, vomiting. PCP: Miles Stewart MD    There are no other complaints, changes, or physical findings at this time. Current Outpatient Medications   Medication Sig Dispense Refill    HYDROcodone-acetaminophen (Norco) 5-325 mg per tablet Take 1 Tab by mouth every four (4) hours as needed for Pain for up to 3 days. Max Daily Amount: 6 Tabs. 8 Tab 0    loteprednol etabonate (LOTEMAX) 0.5 % ophthalmic suspension Administer 1 Drop to both eyes four (4) times daily.  pilocarpine (PILOCAR) 2 % ophthalmic solution Administer 1 Drop to both eyes four (4) times daily.  tamsulosin (FLOMAX) 0.4 mg capsule Take 0.4 mg by mouth daily.  lisinopril (PRINIVIL, ZESTRIL) 40 mg tablet Take 40 mg by mouth daily.  dutaseride (AVODART) 0.5 mg capsule Take 0.5 mg by mouth daily.  BRIMONIDINE TARTRATE (ALPHAGAN P OP) Apply  to eye.  doxepin (SINEQUAN) 10 mg capsule Take  by mouth nightly.        Past History     Past Medical History:  Past Medical History:   Diagnosis Date    Gastrointestinal disorder     hiatal hernia    Glaucoma     Hypertension      Past Surgical History:  Past Surgical History:   Procedure Laterality Date    HX ORTHOPAEDIC      left TKR    HX ORTHOPAEDIC      cervical fusion    HX ORTHOPAEDIC      right foot surgery     Family History:  No family history on file. Social History:  Social History     Tobacco Use    Smoking status: Never Smoker    Smokeless tobacco: Never Used   Substance Use Topics    Alcohol use: Yes    Drug use: No     Allergies:  No Known Allergies  Review of Systems   Review of Systems   Constitutional: Negative for chills and fever. HENT: Negative for congestion, rhinorrhea and sore throat. Respiratory: Negative for cough and shortness of breath. Cardiovascular: Negative for chest pain. Gastrointestinal: Negative for abdominal pain, nausea and vomiting. Genitourinary: Negative for dysuria and urgency. Musculoskeletal: Positive for arthralgias. Skin: Negative for rash. Neurological: Negative for dizziness, light-headedness and headaches. All other systems reviewed and are negative. Physical Exam   Physical Exam  Vitals signs and nursing note reviewed. Constitutional:       General: He is not in acute distress. Appearance: He is well-developed. HENT:      Head: Normocephalic and atraumatic. Eyes:      Conjunctiva/sclera: Conjunctivae normal.      Pupils: Pupils are equal, round, and reactive to light. Neck:      Musculoskeletal: Normal range of motion. Cardiovascular:      Rate and Rhythm: Normal rate and regular rhythm. Pulmonary:      Effort: Pulmonary effort is normal. No respiratory distress. Breath sounds: Normal breath sounds. No stridor. Abdominal:      General: There is no distension. Palpations: Abdomen is soft. Tenderness: There is no abdominal tenderness. Musculoskeletal: Normal range of motion. Skin:     General: Skin is warm and dry. Findings: Abrasion (over left posterior shoulder) present.           Neurological:      Mental Status: He is alert and oriented to person, place, and time. Diagnostic Study Results   Labs -   No results found for this or any previous visit (from the past 12 hour(s)). Radiologic Studies -   XR SHOULDER LT AP/LAT MIN 2 V   Final Result   IMPRESSION:    1. Acute nondisplaced acromial fracture. Xr Shoulder Lt Ap/lat Min 2 V    Result Date: 4/18/2020  IMPRESSION: 1. Acute nondisplaced acromial fracture. Medical Decision Making   I am the first provider for this patient. I reviewed the vital signs, available nursing notes, past medical history, past surgical history, family history and social history. Vital Signs-Reviewed the patient's vital signs. Patient Vitals for the past 12 hrs:   Temp Pulse Resp BP SpO2   04/18/20 1619    157/80 96 %   04/18/20 1515     98 %   04/18/20 1504 98.7 °F (37.1 °C) 79 18 155/77 97 %       Pulse Oximetry Analysis - 98% on ra      Records Reviewed: Nursing Notes and Old Medical Records    Provider Notes (Medical Decision Making):   Ddx: shoulder fx, dislocation, contusion    Plan for xray, reeval    ED Course:   Initial assessment performed. The patients presenting problems have been discussed, and they are in agreement with the care plan formulated and outlined with them. I have encouraged them to ask questions as they arise throughout their visit. xray with acromial fx  Patient placed in his own sling  Advised follow up with ortho  Return precautions discussed    Critical Care:  none    Disposition:  Discharge Note:  4:35 PM  The patient has been re-evaluated and is ready for discharge. Reviewed available results with patient. Counseled patient on diagnosis and care plan. Patient has expressed understanding, and all questions have been answered. Patient agrees with plan and agrees to follow up as recommended, or to return to the ED if their symptoms worsen.  Discharge instructions have been provided and explained to the patient, along with reasons to return to the ED.       PLAN:  1. Discharge Medication List as of 4/18/2020  4:35 PM      START taking these medications    Details   HYDROcodone-acetaminophen (Norco) 5-325 mg per tablet Take 1 Tab by mouth every four (4) hours as needed for Pain for up to 3 days. Max Daily Amount: 6 Tabs., Print, Disp-8 Tab, R-0         CONTINUE these medications which have NOT CHANGED    Details   loteprednol etabonate (LOTEMAX) 0.5 % ophthalmic suspension Administer 1 Drop to both eyes four (4) times daily. , Historical Med      pilocarpine (PILOCAR) 2 % ophthalmic solution Administer 1 Drop to both eyes four (4) times daily. , Historical Med      tamsulosin (FLOMAX) 0.4 mg capsule Take 0.4 mg by mouth daily. , Historical Med      lisinopril (PRINIVIL, ZESTRIL) 40 mg tablet Take 40 mg by mouth daily. , Historical Med      dutaseride (AVODART) 0.5 mg capsule Take 0.5 mg by mouth daily. , Historical Med      BRIMONIDINE TARTRATE (ALPHAGAN P OP) Apply  to eye., Historical Med      doxepin (SINEQUAN) 10 mg capsule Take  by mouth nightly., Historical Med           2. Follow-up Information     Follow up With Specialties Details Why [address] [facility]  Schedule an appointment as soon as possible for a visit  [address]  [place] [address] [address]  [phone]    [facility] EMERGENCY DEPT Emergency Medicine  As needed, If symptoms worsen [address] [place] [address] Drive  [address] [facility]  [phone]        Return to ED if worse     Diagnosis     Clinical Impression:   1. Closed nondisplaced fracture of left acromial process, initial encounter        This note will not be viewable in [employer]. Please note that this dictation was completed with DokDok, the [employer] voice recognition software. Quite often unanticipated grammatical, syntax, homophones, and other interpretive errors are inadvertently transcribed by the computer software. Please disregard these errors.   Please excuse any errors that have escaped final proofreading

## 2021-08-12 ENCOUNTER — APPOINTMENT (OUTPATIENT)
Dept: GENERAL RADIOLOGY | Age: 81
End: 2021-08-12
Attending: EMERGENCY MEDICINE
Payer: MEDICARE

## 2021-08-12 ENCOUNTER — HOSPITAL ENCOUNTER (EMERGENCY)
Age: 81
Discharge: HOME | End: 2021-08-12
Attending: EMERGENCY MEDICINE | Admitting: EMERGENCY MEDICINE
Payer: MEDICARE

## 2021-08-12 VITALS
DIASTOLIC BLOOD PRESSURE: 68 MMHG | WEIGHT: 186.07 LBS | HEART RATE: 73 BPM | BODY MASS INDEX: 25.2 KG/M2 | HEIGHT: 72 IN | RESPIRATION RATE: 18 BRPM | TEMPERATURE: 98.7 F | SYSTOLIC BLOOD PRESSURE: 136 MMHG | OXYGEN SATURATION: 98 %

## 2021-08-12 DIAGNOSIS — S81.831A PUNCTURE WOUND OF RIGHT LOWER LEG, INITIAL ENCOUNTER: Primary | ICD-10-CM

## 2021-08-12 PROCEDURE — 73590 X-RAY EXAM OF LOWER LEG: CPT

## 2021-08-12 PROCEDURE — 74011250636 HC RX REV CODE- 250/636: Performed by: PHYSICIAN ASSISTANT

## 2021-08-12 PROCEDURE — 99282 EMERGENCY DEPT VISIT SF MDM: CPT

## 2021-08-12 PROCEDURE — 96372 THER/PROPH/DIAG INJ SC/IM: CPT

## 2021-08-12 PROCEDURE — 74011000250 HC RX REV CODE- 250: Performed by: PHYSICIAN ASSISTANT

## 2021-08-12 RX ADMIN — LIDOCAINE HYDROCHLORIDE 1 G: 10 INJECTION, SOLUTION EPIDURAL; INFILTRATION; INTRACAUDAL; PERINEURAL at 18:07

## 2021-08-12 NOTE — ED TRIAGE NOTES
Pt states he has a piece of wood in his R shin. States he was [place] and he slipped landing on a piece of wood. Seen at [facility] today and given a tetanus shot. Currently denies pain.

## 2021-08-12 NOTE — DISCHARGE INSTRUCTIONS
Keep wound clean and dry, elevate to decrease pain/swelling. Complete antibiotics provided by [facility]. Follow up with primary care for recheck in 1 to 2 days. Return to the Emergency Dept for any concerns.

## 2021-08-12 NOTE — ED NOTES
1745: pt moved from triage 1 to room 8 after xray for wound irrigation  1819: I have reviewed discharge instructions with the patient. The patient verbalized understanding.  Patient and wife have no question at this time

## 2021-08-13 NOTE — ED PROVIDER NOTES
EMERGENCY DEPARTMENT HISTORY AND PHYSICAL EXAM      Date: 8/12/2021  Patient Name: Abner Allen    History of Presenting Illness     Chief Complaint   Patient presents with    Leg Pain     Pt states he has a piece of wood in his R shin. States he was [place] and he slipped landing on a piece of wood. Seen at [facility] today and given a tetanus shot. Currently denies pain. History Provided By: Patient and wife    HPI: Abner Allen, 80 y.o. male presents ambulatory to the emergency department with his wife in attendance reporting that he was climbing up a [place] slipped and struck his leg on a piece of wood. He was able to pull out a portion of the wood but was concerned that there may be remaining wound left in his leg. He was seen at [facility] prior to his arrival to our emergency department. He states that their x-ray machine is down and they recommended he come to the emergency room so a procedure could be performed if the blood was noted on x-ray. The area was irrigated well and dressed had [facility] and he received a tetanus shot. He had antibiotics phoned into his pharmacy from their location. He denies any pain. States bleeding is controlled it is just oozing some clear fluid. He is not a diabetic. He can weight-bear without difficulty. He denied numbness or tingling. Pt is o/w healthy without fever, chills, cough, congestion, ST, shortness of breath, chest pain, N/V/D. There are no other complaints, changes, or physical findings at this time. PCP: Thomas Kwon MD    Current Outpatient Medications   Medication Sig Dispense Refill    loteprednol etabonate (LOTEMAX) 0.5 % ophthalmic suspension Administer 1 Drop to both eyes four (4) times daily.  pilocarpine (PILOCAR) 2 % ophthalmic solution Administer 1 Drop to both eyes four (4) times daily.  tamsulosin (FLOMAX) 0.4 mg capsule Take 0.4 mg by mouth daily.       BRIMONIDINE TARTRATE (ALPHAGAN P OP) Apply  to eye.  lisinopril (PRINIVIL, ZESTRIL) 40 mg tablet Take 40 mg by mouth daily.  doxepin (SINEQUAN) 10 mg capsule Take  by mouth nightly.  dutaseride (AVODART) 0.5 mg capsule Take 0.5 mg by mouth daily. Past History     Past Medical History:  Past Medical History:   Diagnosis Date    Gastrointestinal disorder     hiatal hernia    Glaucoma     Hypertension        Past Surgical History:  Past Surgical History:   Procedure Laterality Date    HX ORTHOPAEDIC      left TKR    HX ORTHOPAEDIC      cervical fusion    HX ORTHOPAEDIC      right foot surgery       Family History:  History reviewed. No pertinent family history. Social History:  Social History     Tobacco Use    Smoking status: Never Smoker    Smokeless tobacco: Never Used   Substance Use Topics    Alcohol use: Yes    Drug use: No       Allergies:  No Known Allergies      Review of Systems   Review of Systems   Constitutional: Negative for chills and fever. HENT: Negative for congestion, rhinorrhea and sore throat. Respiratory: Negative for cough and shortness of breath. Cardiovascular: Negative for chest pain and palpitations. Gastrointestinal: Negative for abdominal pain, diarrhea, nausea and vomiting. Genitourinary: Negative for dysuria and hematuria. Musculoskeletal: Negative for arthralgias, neck pain and neck stiffness. Skin: Positive for wound. Negative for color change, pallor and rash. Allergic/Immunologic: Negative for food allergies and immunocompromised state. Neurological: Negative for dizziness and headaches. Hematological: Negative for adenopathy. Does not bruise/bleed easily. Psychiatric/Behavioral: Negative for agitation and confusion. All other systems reviewed and are negative. Physical Exam   Physical Exam  Vitals and nursing note reviewed. Constitutional:       General: He is not in acute distress. Appearance: Normal appearance.  He is well-developed and normal weight. He is not toxic-appearing or diaphoretic. HENT:      Head: Normocephalic and atraumatic. Nose: Nose normal.      Mouth/Throat:      Mouth: Mucous membranes are moist.      Pharynx: No oropharyngeal exudate. Eyes:      General: No scleral icterus. Right eye: No discharge. Left eye: No discharge. Conjunctiva/sclera: Conjunctivae normal.   Neck:      Thyroid: No thyromegaly. Vascular: No JVD. Trachea: No tracheal deviation. Cardiovascular:      Rate and Rhythm: Normal rate and regular rhythm. Pulses: Normal pulses. Heart sounds: Normal heart sounds. Pulmonary:      Effort: Pulmonary effort is normal. No respiratory distress. Breath sounds: Normal breath sounds. No wheezing. Musculoskeletal:         General: Signs of injury present. Normal range of motion. Cervical back: Normal range of motion and neck supple. Skin:     General: Skin is warm and dry. Findings: No bruising. Comments: 3cm superficial puncture wound noted to anterior L tibia, mid shin, bleeding controlled, no palpable fb/mass, trace clear drainage noted. NVI. Sensation grossly intact to light touch. Pt observed to ambulate without deficit. Neurological:      General: No focal deficit present. Mental Status: He is alert and oriented to person, place, and time. Sensory: No sensory deficit. Motor: No weakness or abnormal muscle tone. Coordination: Coordination normal.      Gait: Gait normal.   Psychiatric:         Mood and Affect: Mood normal.         Behavior: Behavior normal.         Judgment: Judgment normal.         Diagnostic Study Results     Labs -   No results found for this or any previous visit (from the past 12 hour(s)). Radiologic Studies -   XR TIB/FIB RT   Final Result   No acute abnormality. No radiopaque foreign body visualized.             Medical Decision Making   I am the first provider for this patient. I reviewed the vital signs, available nursing notes, past medical history, past surgical history, family history and social history. Vital Signs-Reviewed the patient's vital signs. Patient Vitals for the past 12 hrs:   Temp Pulse Resp BP SpO2   08/12/21 1810     98 %   08/12/21 1529 98.7 °F (37.1 °C) 73 18 136/68 98 %           Records Reviewed: Nursing Notes and Old Medical Records    Provider Notes (Medical Decision Making):   Foreign body, fracture, puncture wound    ED Course:   Initial assessment performed. The patients presenting problems have been discussed, and they are in agreement with the care plan formulated and outlined with them. I have encouraged them to ask questions as they arise throughout their visit. DISCHARGE NOTE:  The care plan has been outline with the patient and/or family, who verbally conveyed understanding and agreement. Available results have been reviewed. Patient and/or family understand the follow up plan as outlined and discharge instructions. Should their condition deterioration at any time after discharge the patient agrees to return, follow up sooner than outlined or seek medical assistance at the closest Emergency Room as soon as possible. Questions have been answered. Discharge instructions and educational information regarding the patient's diagnosis as well a list of reasons why the patient would want to seek immediate medical attention, should their condition change, were reviewed directly with the patient/family          PLAN:  1. Discharge Medication List as of 8/12/2021  6:19 PM        2.    Follow-up Information     Follow up With Specialties Details Why Contact Info    Koffi Castillo MD Family Medicine In 2 days  [address] [facility]  [phone]      [facility] EMERGENCY DEPT Emergency Medicine  If symptoms worsen [address]  [address] [facility]  [phone]        Return to ED if worse     Diagnosis     Clinical Impression:   1.  Puncture wound of right lower leg, initial encounter

## 2021-08-17 ENCOUNTER — OFFICE VISIT (OUTPATIENT)
Dept: SURGERY | Age: 81
End: 2021-08-17
Payer: MEDICARE

## 2021-08-17 VITALS
BODY MASS INDEX: 24.79 KG/M2 | SYSTOLIC BLOOD PRESSURE: 137 MMHG | RESPIRATION RATE: 16 BRPM | WEIGHT: 183 LBS | HEART RATE: 58 BPM | HEIGHT: 72 IN | OXYGEN SATURATION: 95 % | TEMPERATURE: 97.5 F | DIASTOLIC BLOOD PRESSURE: 55 MMHG

## 2021-08-17 DIAGNOSIS — L02.91 ABSCESS: Primary | ICD-10-CM

## 2021-08-17 PROCEDURE — G8427 DOCREV CUR MEDS BY ELIG CLIN: HCPCS | Performed by: SURGERY

## 2021-08-17 PROCEDURE — 99203 OFFICE O/P NEW LOW 30 MIN: CPT | Performed by: SURGERY

## 2021-08-17 PROCEDURE — G8432 DEP SCR NOT DOC, RNG: HCPCS | Performed by: SURGERY

## 2021-08-17 PROCEDURE — 1101F PT FALLS ASSESS-DOCD LE1/YR: CPT | Performed by: SURGERY

## 2021-08-17 PROCEDURE — G8536 NO DOC ELDER MAL SCRN: HCPCS | Performed by: SURGERY

## 2021-08-17 PROCEDURE — 10061 I&D ABSCESS COMP/MULTIPLE: CPT | Performed by: SURGERY

## 2021-08-17 PROCEDURE — G8420 CALC BMI NORM PARAMETERS: HCPCS | Performed by: SURGERY

## 2021-08-17 RX ORDER — GUAIFENESIN 100 MG/5ML
81 LIQUID (ML) ORAL DAILY
COMMUNITY
Start: 2020-12-25 | End: 2022-06-16

## 2021-08-17 RX ORDER — LIDOCAINE HYDROCHLORIDE AND EPINEPHRINE 10; 10 MG/ML; UG/ML
10 INJECTION, SOLUTION INFILTRATION; PERINEURAL ONCE
Qty: 1 VIAL | Refills: 0
Start: 2021-08-17 | End: 2021-08-17

## 2021-08-17 RX ORDER — CHOLECALCIFEROL (VITAMIN D3) 125 MCG
5 CAPSULE ORAL
COMMUNITY

## 2021-08-17 RX ORDER — SULFAMETHOXAZOLE AND TRIMETHOPRIM 800; 160 MG/1; MG/1
1 TABLET ORAL 2 TIMES DAILY
COMMUNITY
Start: 2021-08-12 | End: 2022-06-16

## 2021-08-17 NOTE — PROGRESS NOTES
Identified pt with two pt identifiers(name and ). Reviewed record in preparation for visit and have obtained necessary documentation. All patient medications has been reviewed. Chief Complaint   Patient presents with    Skin Problem     Seen at the request of Dr Antonio Virgen for eval of Right lower leg infection       Health Maintenance Due   Topic    Hepatitis C Screening     COVID-19 Vaccine (1)    Shingrix Vaccine Age 50> (1 of 2)    Pneumococcal 65+ years (1 of 1 - PPSV23)    Medicare Yearly Exam        Vitals:    21 0955   BP: (!) 137/55   Pulse: (!) 58   Resp: 16   Temp: 97.5 °F (36.4 °C)   TempSrc: Temporal   SpO2: 95%   Weight: 83 kg (183 lb)   Height: 6' (1.829 m)   PainSc:   4   PainLoc: Leg       4. Have you been to the ER, urgent care clinic since your last visit? Hospitalized since your last visit? No    5. Have you seen or consulted any other health care providers outside of the [address] [place] [address] since your last visit? Include any pap smears or colon screening. No      Patient is accompanied by spouse I have received verbal consent from Kimberly Anne to discuss any/all medical information while they are present in the room.

## 2021-08-20 ENCOUNTER — OFFICE VISIT (OUTPATIENT)
Dept: SURGERY | Age: 81
End: 2021-08-20
Payer: MEDICARE

## 2021-08-20 VITALS
SYSTOLIC BLOOD PRESSURE: 125 MMHG | RESPIRATION RATE: 16 BRPM | OXYGEN SATURATION: 96 % | TEMPERATURE: 97.3 F | HEIGHT: 72 IN | HEART RATE: 67 BPM | WEIGHT: 180 LBS | BODY MASS INDEX: 24.38 KG/M2 | DIASTOLIC BLOOD PRESSURE: 67 MMHG

## 2021-08-20 DIAGNOSIS — L02.91 ABSCESS: Primary | ICD-10-CM

## 2021-08-20 PROCEDURE — 99024 POSTOP FOLLOW-UP VISIT: CPT | Performed by: SURGERY

## 2021-08-20 NOTE — PROGRESS NOTES
To: Wenceslao Tran MD    From: Tiana Howell MD    Thank you for referring Sissy Anderson. It is looking better. Have a great weekend. Encounter Date: 8/20/2021    Subjective:      Sissy Anderson is a 80 y.o. male presents for postop care following debridement of the right shin puncture wound. I opened it up and irrigated it the other day. Did not find any retained foreign body. We packed it then he has been doing soaks. Objective:     General:  alert, cooperative, no distress, appears stated age   Right shin:   There is decreased erythema. It is  to palpation. No pus. Assessment:     S/p debridement of right shin puncture wound. Doing well postoperatively. Plan:     I removed the old packing, irrigated, and repacked it. He is having some difficulty doing the soaks due to the size of the bucket so he will irrigate it in the shower twice daily. They will remove 1 inch of packing per day. I will see him back in the office on Monday to repack it.      Tiana Howell MD

## 2021-08-20 NOTE — Clinical Note
8/20/2021    Patient: Shira Boudreaux   YOB: 1940   Date of Visit: 8/20/2021     Mary Jo Mello MD  [address] [address]  Via Fax: [phone]    Dear Mary Jo Mello MD,      Thank you for referring Mr. Shira Boudreaux to [address] for evaluation. My notes for this consultation are attached. If you have questions, please do not hesitate to call me. I look forward to following your patient along with you.       Sincerely,    Corrine Shields MD
no

## 2021-08-20 NOTE — PROGRESS NOTES
Identified pt with two pt identifiers(name and ). Reviewed record in preparation for visit and have obtained necessary documentation. All patient medications has been reviewed. Chief Complaint   Patient presents with    Surgical Follow-up     I & D of abscess on lower Right leg       Health Maintenance Due   Topic    Hepatitis C Screening     COVID-19 Vaccine (1)    Shingrix Vaccine Age 50> (1 of 2)    Pneumococcal 65+ years (1 of 1 - PPSV23)    Medicare Yearly Exam        Vitals:    21 0920   BP: 125/67   Pulse: 67   Resp: 16   Temp: 97.3 °F (36.3 °C)   TempSrc: Temporal   SpO2: 96%   Weight: 81.6 kg (180 lb)   Height: 6' (1.829 m)   PainSc:   3   PainLoc: Leg       4. Have you been to the ER, urgent care clinic since your last visit? Hospitalized since your last visit? No    5. Have you seen or consulted any other health care providers outside of the [address] [place] [address] since your last visit? Include any pap smears or colon screening. No      Patient is accompanied by self I have received verbal consent from Tara Lebron to discuss any/all medical information while they are present in the room.

## 2021-08-22 LAB
BACTERIA SPEC CULT: NORMAL
GRAM STN SPEC: NORMAL
GRAM STN SPEC: NORMAL
SERVICE CMNT-IMP: NORMAL

## 2021-08-23 ENCOUNTER — OFFICE VISIT (OUTPATIENT)
Dept: SURGERY | Age: 81
End: 2021-08-23
Payer: MEDICARE

## 2021-08-23 VITALS
BODY MASS INDEX: 24.38 KG/M2 | OXYGEN SATURATION: 99 % | HEIGHT: 72 IN | SYSTOLIC BLOOD PRESSURE: 130 MMHG | WEIGHT: 180 LBS | DIASTOLIC BLOOD PRESSURE: 65 MMHG | TEMPERATURE: 97.1 F | HEART RATE: 71 BPM | RESPIRATION RATE: 16 BRPM

## 2021-08-23 DIAGNOSIS — Z09 POSTOPERATIVE EXAMINATION: Primary | ICD-10-CM

## 2021-08-23 PROCEDURE — 99024 POSTOP FOLLOW-UP VISIT: CPT | Performed by: SURGERY

## 2021-08-23 NOTE — Clinical Note
8/29/2021    Patient: Tiana Capellan   YOB: 1940   Date of Visit: 8/23/2021     Dorene Pearson MD  [address] [address]  Via Fax: [phone]    Dear Dorene Pearson MD,      Thank you for referring Mr. Tiana Capellan to [address] [place] [address] for evaluation. My notes for this consultation are attached. If you have questions, please do not hesitate to call me. I look forward to following your patient along with you.       Sincerely,    Serafin Martinez MD

## 2021-08-23 NOTE — PROGRESS NOTES
Identified pt with two pt identifiers(name and ). Reviewed record in preparation for visit and have obtained necessary documentation. All patient medications has been reviewed. Chief Complaint   Patient presents with    Follow-up     S/P debridement of right shun puncture wound 21       Health Maintenance Due   Topic    Hepatitis C Screening     COVID-19 Vaccine (1)    Shingrix Vaccine Age 50> (1 of 2)    Pneumococcal 65+ years (1 of 1 - PPSV23)    Medicare Yearly Exam        Vitals:    21 1008   BP: 130/65   Pulse: 71   Resp: 16   Temp: 97.1 °F (36.2 °C)   SpO2: 99%   Weight: 81.6 kg (180 lb)   Height: 6' (1.829 m)   PainSc:  10 - Worst pain ever   PainLoc: Leg       4. Have you been to the ER, urgent care clinic since your last visit? Hospitalized since your last visit? No    5. Have you seen or consulted any other health care providers outside of the [address] [place] [address] since your last visit? Include any pap smears or colon screening. No      Patient is accompanied by wife I have received verbal consent from Tiana Capellan to discuss any/all medical information while they are present in the room.

## 2021-08-27 ENCOUNTER — OFFICE VISIT (OUTPATIENT)
Dept: SURGERY | Age: 81
End: 2021-08-27
Payer: MEDICARE

## 2021-08-27 VITALS
BODY MASS INDEX: 24.63 KG/M2 | TEMPERATURE: 97.1 F | DIASTOLIC BLOOD PRESSURE: 71 MMHG | OXYGEN SATURATION: 98 % | HEART RATE: 66 BPM | WEIGHT: 181.6 LBS | SYSTOLIC BLOOD PRESSURE: 135 MMHG

## 2021-08-27 DIAGNOSIS — L02.91 ABSCESS: Primary | ICD-10-CM

## 2021-08-27 PROCEDURE — 1101F PT FALLS ASSESS-DOCD LE1/YR: CPT | Performed by: SURGERY

## 2021-08-27 PROCEDURE — G8536 NO DOC ELDER MAL SCRN: HCPCS | Performed by: SURGERY

## 2021-08-27 PROCEDURE — G8420 CALC BMI NORM PARAMETERS: HCPCS | Performed by: SURGERY

## 2021-08-27 PROCEDURE — G8432 DEP SCR NOT DOC, RNG: HCPCS | Performed by: SURGERY

## 2021-08-27 PROCEDURE — G8427 DOCREV CUR MEDS BY ELIG CLIN: HCPCS | Performed by: SURGERY

## 2021-08-27 PROCEDURE — 99212 OFFICE O/P EST SF 10 MIN: CPT | Performed by: SURGERY

## 2021-08-27 NOTE — PROGRESS NOTES
To: Eddie Vital MD    From: Macy Pierre MD    Thank you for referring Rick Streeter. If you have a nice rest of the summer. Encounter Date: 8/27/2021    Subjective:      Rick Streeter is a 80 y.o. male presents for postop care following debridement of the right shin puncture wound. I opened it up and debrided nonviable tissue. Did not find any retained foreign body. I have been changing ribbon packing at the 10 to 12 o'clock position from the opening every few days. He really does not like the packing due to the pain it causes. Objective:     General:  alert, cooperative, no distress, appears stated age   Right shin:  Erythema resolved. Still with small subcutaneous opening at the 10 to 12 o'clock position, but smaller. Tracks about 1 cm. No pus. Assessment:     S/p debridement of right shin puncture wound. Doing well postoperatively. Plan:     I removed the old packing. He does not want to repack it. In lieu of this I stressed the importance of twice daily soaks and massaging the 10 to 12 o'clock position towards the skin opening. As long as he does this it should heal in by secondary intention without new infection. I offered to see him again next week to be sure this strategy is working, but he said he did call if he had any concerns.     Macy Pierre MD

## 2021-08-27 NOTE — PROGRESS NOTES
Identified pt with two pt identifiers(name and ). Reviewed record in preparation for visit and have obtained necessary documentation. All patient medications has been reviewed. Chief Complaint   Patient presents with    Surgical Follow-up     f/u of debridement of wound on right shin puncture wound       Health Maintenance Due   Topic    Hepatitis C Screening     COVID-19 Vaccine (1)    Shingrix Vaccine Age 50> (1 of 2)    Pneumococcal 65+ years (1 of 1 - PPSV23)    Medicare Yearly Exam        Vitals:    21 0956   Weight: 82.4 kg (181 lb 9.6 oz)   PainSc:   1   PainLoc: Leg       4. Have you been to the ER, urgent care clinic since your last visit? Hospitalized since your last visit? No    5. Have you seen or consulted any other health care providers outside of the [address] [place] [address] since your last visit? Include any pap smears or colon screening. No      Patient is accompanied by wife I have received verbal consent from Tejal Leung to discuss any/all medical information while they are present in the room.

## 2021-08-27 NOTE — Clinical Note
8/27/2021    Patient: Mónica Roe   YOB: 1940   Date of Visit: 8/27/2021     Nayely Hernandez MD  [address] [address]  Via Fax: [phone]    Dear Nayely Hernandez MD,      Thank you for referring Mr. Mónica Roe to [address] for evaluation. My notes for this consultation are attached. If you have questions, please do not hesitate to call me. I look forward to following your patient along with you.       Sincerely,    Donita Bustos MD

## 2021-08-29 NOTE — PROGRESS NOTES
To: Dav Monique MD    From: Shraddha Kolb MD    Thank you. Encounter Date: 8/23/2021    Subjective:      Juliet Sanchez is a 80 y.o. male presents for postop care following debridement of the right shin puncture wound. I opened it up and irrigated it on 8/20. Did not find any retained foreign body. We packed it then he has been doing soaks and returning here for packing changes. Objective:     General:  alert, cooperative, no distress, appears stated age   Right shin:   There is decreased erythema. It is  to palpation. No pus. Still with a subcutaneous space to the 10-12 o'clock area. Assessment:     S/p debridement of right shin puncture wound. Doing well postoperatively. Plan:     I removed the old packing, irrigated, and repacked it. He will continue the soaks. Return in 3 days.     Shraddha Kolb MD

## 2021-09-01 NOTE — PROGRESS NOTES
To: Hilario Hall MD    From: Padmini Rodriguez MD    Thank you for sending Timmy De Leon to see us. Enjoyed meeting him. Please note that this dictation was completed with [employer], the computer voice recognition software. Quite often unanticipated grammatical, syntax, homophones, and other interpretive errors are inadvertently transcribed by the software. Please disregard these errors. Please excuse any errors that have escaped final proofreading. Encounter Date: 8/17/2021  History and Physical    Assessment:   Infected right shin wound. Puncture wound. Possible retained foreign body. Surrounding swelling and cellulitis. Body mass index is 24.82 kg/m². Comorbid hypertension. No anticoagulation. Plan:   I recommended incision and drainage. Removal of any possible foreign body. Debridement of nonviable tissues. Risks including bleeding and infection were explained to the patient. The patient expressed understanding of the risks, and all questions were answered to the patient's satisfaction. HPI:   Timmy De Leon is a 80 y.o. male who is seen in consultation at the request of Hilario Hall MD for evaluation of a wound on his right shin. He tells me that he was hiking along the [place] last week and he slipped along the bank and a piece of a tree branch went into his leg. He pulled out a fairly large piece. He had x-rays done and no foreign material was seen. He was started on Bactrim and he got a dose of Rocephin in the emergency department. He followed up with Dr. Diya Wetzel. At that time Dr. Diya Wetzel noted increased swelling and redness. The patient said it had been going on for a day or so. He has not had any fever or chills. He says his right foot has been swollen.     Past Medical History:   Diagnosis Date    Gastrointestinal disorder     hiatal hernia    Glaucoma     Hypertension      Past Surgical History:   Procedure Laterality Date    HX ORTHOPAEDIC      left TKR    HX ORTHOPAEDIC      cervical fusion    HX ORTHOPAEDIC      right foot surgery    HX OTHER SURGICAL  08/18/2021    Incision and drainage of abscess Right lower leg      History reviewed. No pertinent family history. Social History     Tobacco Use    Smoking status: Never Smoker    Smokeless tobacco: Never Used   Substance Use Topics    Alcohol use: Yes      Current Outpatient Medications   Medication Sig    aspirin 81 mg chewable tablet Take 81 mg by mouth daily.  melatonin 5 mg tablet Take 5 mg by mouth nightly.  trimethoprim-sulfamethoxazole (BACTRIM DS, SEPTRA DS) 160-800 mg per tablet Take 1 Tablet by mouth two (2) times a day.  loteprednol etabonate (LOTEMAX) 0.5 % ophthalmic suspension Administer 1 Drop to both eyes four (4) times daily.  pilocarpine (PILOCAR) 2 % ophthalmic solution Administer 1 Drop to both eyes four (4) times daily.  tamsulosin (FLOMAX) 0.4 mg capsule Take 0.4 mg by mouth daily.  lisinopril (PRINIVIL, ZESTRIL) 40 mg tablet Take 40 mg by mouth daily.  dutaseride (AVODART) 0.5 mg capsule Take 0.5 mg by mouth daily.  BRIMONIDINE TARTRATE (ALPHAGAN P OP) Apply  to eye. (Patient not taking: Reported on 8/17/2021)    doxepin (SINEQUAN) 10 mg capsule Take  by mouth nightly. (Patient not taking: Reported on 8/17/2021)     No current facility-administered medications for this visit. Allergies:  No Known Allergies    Review of Systems:  10 systems reviewed. See scanned sheet in \"Media\" section. See HPI for pertinent positives and negatives.       Objective:     Visit Vitals  BP (!) 137/55 (BP 1 Location: Left upper arm, BP Patient Position: Sitting, BP Cuff Size: Adult)   Pulse (!) 58   Temp 97.5 °F (36.4 °C) (Temporal)   Resp 16   Ht 6' (1.829 m)   Wt 83 kg (183 lb)   SpO2 95%   BMI 24.82 kg/m²       Physical Exam:  General appearance  Alert, cooperative, no distress, appears stated age   [de-identified] Anicteric           Lungs   Clear to auscultation bilaterally   Heart  Regular rate and rhythm. Extremities no cyanosis or edema. There is a wound on the right shin with erythema and swelling extending superiorly. There is exudate in the wound. The right foot is mildly edematous. Pulses are intact. He is sensate throughout. Pulses 2+ right radial       Lymph nodes No palpable inguinal LAD. Neurologic Without overt sensory or motor deficit     Incision/Drainage Procedure Note    Encounter Date: 8/17/2021    Location of lesion: See above     Time out at performed by me (see consent form):  * Patient was identified by name and date of birth   * Agreement on procedure being performed was verified  * Risks and Benefits explained to the patient  * Procedure site verified and marked as necessary  * Patient was positioned for comfort  * Consent was signed and verified    Procedure Details: The risks,benefits and alternatives were explained and consent was obtained for the procedure. Time out was performed. The area was prepped and draped in the usual manner. Local anesthesia was infiltrated into the skin around the laceration. An incision was made extending the laceration superiorly. Purulent fluid was evacuated. A culture was obtained. Nonviable skin and subcutaneous adipose were debrided. No foreign body was found. The wound was irrigated with saline. The wound was packed with iodoform gauze. A sterile dressing was then applied. Estimated Blood Loss:  minimal     Disposition:  Procedure well tolerated. Post-procedure pain scale: 2/10. Plan:  BID soaks. Complete antibiotic course. Return to clinic in 3 days packing change.     Signed By: Selina Borja MD

## 2021-10-15 ENCOUNTER — TELEPHONE (OUTPATIENT)
Dept: SURGERY | Age: 81
End: 2021-10-15

## 2021-10-15 NOTE — TELEPHONE ENCOUNTER
Informed patient these vaccines were administered via PCP, therefore she need to F/U with PCP office. COVID VACCINE card scanned into system. Express understanding.

## 2021-10-15 NOTE — TELEPHONE ENCOUNTER
Patient's wife called to ask about how to get vaccination dates into [facility]. She has seen some vaccines in \"upcoming health maintenance required\" that he has had and wants to know how to input dates of those. Please call at:  [phone]    She said that you have to wait until the 6-7 rings before it connects to vm system, but to feel free to leave a msg if no one picks up.

## 2022-06-14 ENCOUNTER — HOSPITAL ENCOUNTER (OUTPATIENT)
Dept: PREADMISSION TESTING | Age: 82
Discharge: HOME | End: 2022-06-14
Attending: UROLOGY
Payer: MEDICARE

## 2022-06-14 ENCOUNTER — HOSPITAL ENCOUNTER (OUTPATIENT)
Dept: GENERAL RADIOLOGY | Age: 82
Discharge: HOME | End: 2022-06-14
Attending: UROLOGY
Payer: MEDICARE

## 2022-06-14 VITALS
BODY MASS INDEX: 24.55 KG/M2 | TEMPERATURE: 98.6 F | HEIGHT: 72 IN | SYSTOLIC BLOOD PRESSURE: 106 MMHG | WEIGHT: 181.22 LBS | DIASTOLIC BLOOD PRESSURE: 56 MMHG | HEART RATE: 79 BPM | OXYGEN SATURATION: 96 % | RESPIRATION RATE: 16 BRPM

## 2022-06-14 LAB
ABO + RH BLD: NORMAL
ALBUMIN SERPL-MCNC: 3.5 G/DL (ref 3.5–5)
ALBUMIN/GLOB SERPL: 1.1 {RATIO} (ref 1.1–2.2)
ALP SERPL-CCNC: 83 U/L (ref 45–117)
ALT SERPL-CCNC: 22 U/L (ref 12–78)
ANION GAP SERPL CALC-SCNC: 3 MMOL/L (ref 5–15)
AST SERPL-CCNC: 24 U/L (ref 15–37)
BILIRUB SERPL-MCNC: 0.7 MG/DL (ref 0.2–1)
BLOOD GROUP ANTIBODIES SERPL: NORMAL
BUN SERPL-MCNC: 25 MG/DL (ref 6–20)
BUN/CREAT SERPL: 21 (ref 12–20)
CALCIUM SERPL-MCNC: 8.9 MG/DL (ref 8.5–10.1)
CHLORIDE SERPL-SCNC: 109 MMOL/L (ref 97–108)
CO2 SERPL-SCNC: 29 MMOL/L (ref 21–32)
CREAT SERPL-MCNC: 1.2 MG/DL (ref 0.7–1.3)
ERYTHROCYTE [DISTWIDTH] IN BLOOD BY AUTOMATED COUNT: 15.5 % (ref 11.5–14.5)
GLOBULIN SER CALC-MCNC: 3.3 G/DL (ref 2–4)
GLUCOSE SERPL-MCNC: 103 MG/DL (ref 65–100)
HCT VFR BLD AUTO: 37.2 % (ref 36.6–50.3)
HGB BLD-MCNC: 12.7 G/DL (ref 12.1–17)
MCH RBC QN AUTO: 34.7 PG (ref 26–34)
MCHC RBC AUTO-ENTMCNC: 34.1 G/DL (ref 30–36.5)
MCV RBC AUTO: 101.6 FL (ref 80–99)
NRBC # BLD: 0 K/UL (ref 0–0.01)
NRBC BLD-RTO: 0 PER 100 WBC
PLATELET # BLD AUTO: 238 K/UL (ref 150–400)
PMV BLD AUTO: 9.7 FL (ref 8.9–12.9)
POTASSIUM SERPL-SCNC: 4.1 MMOL/L (ref 3.5–5.1)
PROT SERPL-MCNC: 6.8 G/DL (ref 6.4–8.2)
RBC # BLD AUTO: 3.66 M/UL (ref 4.1–5.7)
SODIUM SERPL-SCNC: 141 MMOL/L (ref 136–145)
SPECIMEN EXP DATE BLD: NORMAL
WBC # BLD AUTO: 5.6 K/UL (ref 4.1–11.1)

## 2022-06-14 PROCEDURE — 86900 BLOOD TYPING SEROLOGIC ABO: CPT

## 2022-06-14 PROCEDURE — 71046 X-RAY EXAM CHEST 2 VIEWS: CPT

## 2022-06-14 PROCEDURE — 85027 COMPLETE CBC AUTOMATED: CPT

## 2022-06-14 PROCEDURE — 80053 COMPREHEN METABOLIC PANEL: CPT

## 2022-06-14 PROCEDURE — 36415 COLL VENOUS BLD VENIPUNCTURE: CPT

## 2022-06-14 RX ORDER — LUTEIN 6 MG
20 TABLET ORAL DAILY
COMMUNITY

## 2022-06-14 RX ORDER — AMLODIPINE BESYLATE 2.5 MG/1
2.5 TABLET ORAL DAILY
COMMUNITY

## 2022-06-14 RX ORDER — SODIUM CHLORIDE, SODIUM LACTATE, POTASSIUM CHLORIDE, CALCIUM CHLORIDE 600; 310; 30; 20 MG/100ML; MG/100ML; MG/100ML; MG/100ML
25 INJECTION, SOLUTION INTRAVENOUS CONTINUOUS
Status: CANCELLED | OUTPATIENT
Start: 2022-06-16

## 2022-06-14 NOTE — PERIOP NOTES
[facility]  Preoperative Instructions    Surgery Date 6/16/2022          Time of Arrival to be called  Contact # [phone]    1. On the day of your surgery, please report to the Surgical Services Registration Desk and sign in at your designated time. The Surgery Center is located to the right of the Emergency Room. 2. You must have someone with you to drive you home. You should not drive a car for 24 hours following surgery. Please make arrangements for a friend or family member to stay with you for the first 24 hours after your surgery. 3. Do not have anything to eat or drink (including water, gum, mints, coffee, juice) after midnight 6/15/2022. ? This may not apply to medications prescribed by your physician. ?(Please note below the special instructions with medications to take the morning of your procedure.)    4. We recommend you do not drink any alcoholic beverages for 24 hours before and after your surgery. 5. Contact your surgeons office for instructions on the following medications: non-steroidal anti-inflammatory drugs (i.e. Advil, Aleve), vitamins, and supplements. (Some surgeons will want you to stop these medications prior to surgery and others may allow you to take them)  **If you are currently taking Plavix, Coumadin, Aspirin and/or other blood-thinning agents, contact your surgeon for instructions. ** Your surgeon will partner with the physician prescribing these medications to determine if it is safe to stop or if you need to continue taking. Please do not stop taking these medications without instructions from your surgeon    6. Wear comfortable clothes. Wear glasses instead of contacts. Do not bring any money or jewelry. Please bring picture ID, insurance card, and any prearranged co-payment or hospital payment. Do not wear make-up, particularly mascara the morning of your surgery. Do not wear nail polish, particularly if you are having foot /hand surgery. Wear your hair loose or down, no ponytails, buns, gato pins or clips. All body piercings must be removed. Please shower with antibacterial soap for three consecutive days before and on the morning of surgery, but do not apply any lotions, powders or deodorants after the shower on the day of surgery. Please use a fresh towels after each shower. Please sleep in clean clothes and change bed linens the night before surgery. Please do not shave for 48 hours prior to surgery. Shaving of the face is acceptable. 7. You should understand that if you do not follow these instructions your surgery may be cancelled. If your physical condition changes (I.e. fever, cold or flu) please contact your surgeon as soon as possible. 8. It is important that you be on time. If a situation occurs where you may be late, please call [phone] (OR Holding Area). 9. If you have any questions and or problems, please call [phone] (Pre-admission Testing). 10. Your surgery time may be subject to change. You will receive a phone call the evening prior if your time changes. 11.  If having outpatient surgery, you must have someone to drive you here, stay with you during the duration of your stay, and to drive you home at time of discharge. 12.  Due to current COVID restrictions, only ONE adult may accompany you the day of the procedure. We have limited seating available. If our waiting room is at capacity, your ride may be asked to remain in their vehicle. No children are allowed in the waiting room. Special Instructions:   Bring your covid vaccine card and Advanced Care Planning Documents on day of surgery. TAKE ALL MEDICATIONS DAY OF SURGERY EXCEPT: Vitamins/Supplements,       I understand a pre-operative phone call will be made to verify my surgery time. In the event that I am not available, I give permission for a message to be left on my answering service and/or with another person?   yes ___________________       __________   6/14/2022 @ 4866    (Signature of Patient)             (Witness)                (Date and Time)

## 2022-06-16 ENCOUNTER — ANESTHESIA (OUTPATIENT)
Dept: SURGERY | Age: 82
End: 2022-06-16
Payer: MEDICARE

## 2022-06-16 ENCOUNTER — HOSPITAL ENCOUNTER (OUTPATIENT)
Age: 82
Setting detail: OUTPATIENT SURGERY
Discharge: HOME | End: 2022-06-16
Attending: UROLOGY | Admitting: UROLOGY
Payer: MEDICARE

## 2022-06-16 ENCOUNTER — ANESTHESIA EVENT (OUTPATIENT)
Dept: SURGERY | Age: 82
End: 2022-06-16
Payer: MEDICARE

## 2022-06-16 VITALS
RESPIRATION RATE: 14 BRPM | TEMPERATURE: 98 F | OXYGEN SATURATION: 98 % | WEIGHT: 176.59 LBS | SYSTOLIC BLOOD PRESSURE: 135 MMHG | HEIGHT: 72 IN | HEART RATE: 60 BPM | DIASTOLIC BLOOD PRESSURE: 68 MMHG | BODY MASS INDEX: 23.92 KG/M2

## 2022-06-16 DIAGNOSIS — N40.1 BENIGN PROSTATIC HYPERPLASIA WITH INCOMPLETE BLADDER EMPTYING: Primary | ICD-10-CM

## 2022-06-16 DIAGNOSIS — R39.14 BENIGN PROSTATIC HYPERPLASIA WITH INCOMPLETE BLADDER EMPTYING: Primary | ICD-10-CM

## 2022-06-16 PROCEDURE — 2709999900 HC NON-CHARGEABLE SUPPLY: Performed by: UROLOGY

## 2022-06-16 PROCEDURE — 74011000250 HC RX REV CODE- 250: Performed by: UROLOGY

## 2022-06-16 PROCEDURE — 74011250636 HC RX REV CODE- 250/636: Performed by: NURSE ANESTHETIST, CERTIFIED REGISTERED

## 2022-06-16 PROCEDURE — 74011000250 HC RX REV CODE- 250: Performed by: NURSE ANESTHETIST, CERTIFIED REGISTERED

## 2022-06-16 PROCEDURE — 74011250636 HC RX REV CODE- 250/636: Performed by: ANESTHESIOLOGY

## 2022-06-16 PROCEDURE — 77030008684 HC TU ET CUF COVD -B: Performed by: ANESTHESIOLOGY

## 2022-06-16 PROCEDURE — 77030040831 HC BAG URINE DRNG MDII -A: Performed by: UROLOGY

## 2022-06-16 PROCEDURE — 77030019905 HC CATH URETH INTMIT MDII -A: Performed by: UROLOGY

## 2022-06-16 PROCEDURE — 76210000016 HC OR PH I REC 1 TO 1.5 HR: Performed by: UROLOGY

## 2022-06-16 PROCEDURE — 77030021678 HC GLIDESCP STAT DISP VERT -B: Performed by: ANESTHESIOLOGY

## 2022-06-16 PROCEDURE — 74011250637 HC RX REV CODE- 250/637: Performed by: UROLOGY

## 2022-06-16 PROCEDURE — 76010000171 HC OR TIME 2 TO 2.5 HR INTENSV-TIER 1: Performed by: UROLOGY

## 2022-06-16 PROCEDURE — 74011250636 HC RX REV CODE- 250/636: Performed by: UROLOGY

## 2022-06-16 PROCEDURE — 77030026438 HC STYL ET INTUB CARD -A: Performed by: ANESTHESIOLOGY

## 2022-06-16 PROCEDURE — 76210000021 HC REC RM PH II 0.5 TO 1 HR: Performed by: UROLOGY

## 2022-06-16 PROCEDURE — 77030019908 HC STETH ESOPH SIMS -A: Performed by: ANESTHESIOLOGY

## 2022-06-16 PROCEDURE — 74011000272 HC RX REV CODE- 272: Performed by: UROLOGY

## 2022-06-16 PROCEDURE — 76060000035 HC ANESTHESIA 2 TO 2.5 HR: Performed by: UROLOGY

## 2022-06-16 RX ORDER — MIDAZOLAM HYDROCHLORIDE 1 MG/ML
1 INJECTION, SOLUTION INTRAMUSCULAR; INTRAVENOUS AS NEEDED
Status: DISCONTINUED | OUTPATIENT
Start: 2022-06-16 | End: 2022-06-16 | Stop reason: HOSPADM

## 2022-06-16 RX ORDER — SODIUM CHLORIDE, SODIUM LACTATE, POTASSIUM CHLORIDE, CALCIUM CHLORIDE 600; 310; 30; 20 MG/100ML; MG/100ML; MG/100ML; MG/100ML
25 INJECTION, SOLUTION INTRAVENOUS CONTINUOUS
Status: DISCONTINUED | OUTPATIENT
Start: 2022-06-16 | End: 2022-06-16 | Stop reason: HOSPADM

## 2022-06-16 RX ORDER — CEPHALEXIN 250 MG/1
500 CAPSULE ORAL 3 TIMES DAILY
Status: DISCONTINUED | OUTPATIENT
Start: 2022-06-16 | End: 2022-06-16 | Stop reason: HOSPADM

## 2022-06-16 RX ORDER — FENTANYL CITRATE 50 UG/ML
INJECTION, SOLUTION INTRAMUSCULAR; INTRAVENOUS AS NEEDED
Status: DISCONTINUED | OUTPATIENT
Start: 2022-06-16 | End: 2022-06-16 | Stop reason: HOSPADM

## 2022-06-16 RX ORDER — DEXAMETHASONE SODIUM PHOSPHATE 4 MG/ML
INJECTION, SOLUTION INTRA-ARTICULAR; INTRALESIONAL; INTRAMUSCULAR; INTRAVENOUS; SOFT TISSUE AS NEEDED
Status: DISCONTINUED | OUTPATIENT
Start: 2022-06-16 | End: 2022-06-16 | Stop reason: HOSPADM

## 2022-06-16 RX ORDER — FENTANYL CITRATE 50 UG/ML
50 INJECTION, SOLUTION INTRAMUSCULAR; INTRAVENOUS AS NEEDED
Status: DISCONTINUED | OUTPATIENT
Start: 2022-06-16 | End: 2022-06-16 | Stop reason: HOSPADM

## 2022-06-16 RX ORDER — SUCCINYLCHOLINE CHLORIDE 20 MG/ML
INJECTION INTRAMUSCULAR; INTRAVENOUS AS NEEDED
Status: DISCONTINUED | OUTPATIENT
Start: 2022-06-16 | End: 2022-06-16 | Stop reason: HOSPADM

## 2022-06-16 RX ORDER — HYDROMORPHONE HYDROCHLORIDE 1 MG/ML
.2-.5 INJECTION, SOLUTION INTRAMUSCULAR; INTRAVENOUS; SUBCUTANEOUS
Status: DISCONTINUED | OUTPATIENT
Start: 2022-06-16 | End: 2022-06-16 | Stop reason: HOSPADM

## 2022-06-16 RX ORDER — PROPOFOL 10 MG/ML
INJECTION, EMULSION INTRAVENOUS AS NEEDED
Status: DISCONTINUED | OUTPATIENT
Start: 2022-06-16 | End: 2022-06-16 | Stop reason: HOSPADM

## 2022-06-16 RX ORDER — PROPOFOL 10 MG/ML
INJECTION, EMULSION INTRAVENOUS
Status: DISCONTINUED | OUTPATIENT
Start: 2022-06-16 | End: 2022-06-16 | Stop reason: HOSPADM

## 2022-06-16 RX ORDER — ONDANSETRON 2 MG/ML
INJECTION INTRAMUSCULAR; INTRAVENOUS AS NEEDED
Status: DISCONTINUED | OUTPATIENT
Start: 2022-06-16 | End: 2022-06-16 | Stop reason: HOSPADM

## 2022-06-16 RX ORDER — EPHEDRINE SULFATE/0.9% NACL/PF 50 MG/5 ML
SYRINGE (ML) INTRAVENOUS AS NEEDED
Status: DISCONTINUED | OUTPATIENT
Start: 2022-06-16 | End: 2022-06-16 | Stop reason: HOSPADM

## 2022-06-16 RX ORDER — ONDANSETRON 2 MG/ML
4 INJECTION INTRAMUSCULAR; INTRAVENOUS AS NEEDED
Status: DISCONTINUED | OUTPATIENT
Start: 2022-06-16 | End: 2022-06-16 | Stop reason: HOSPADM

## 2022-06-16 RX ORDER — LIDOCAINE HYDROCHLORIDE 20 MG/ML
INJECTION, SOLUTION EPIDURAL; INFILTRATION; INTRACAUDAL; PERINEURAL AS NEEDED
Status: DISCONTINUED | OUTPATIENT
Start: 2022-06-16 | End: 2022-06-16 | Stop reason: HOSPADM

## 2022-06-16 RX ORDER — FENTANYL CITRATE 50 UG/ML
25 INJECTION, SOLUTION INTRAMUSCULAR; INTRAVENOUS
Status: DISCONTINUED | OUTPATIENT
Start: 2022-06-16 | End: 2022-06-16 | Stop reason: HOSPADM

## 2022-06-16 RX ORDER — LIDOCAINE HYDROCHLORIDE 10 MG/ML
0.1 INJECTION, SOLUTION EPIDURAL; INFILTRATION; INTRACAUDAL; PERINEURAL AS NEEDED
Status: DISCONTINUED | OUTPATIENT
Start: 2022-06-16 | End: 2022-06-16 | Stop reason: HOSPADM

## 2022-06-16 RX ADMIN — PROPOFOL 30 MG: 10 INJECTION, EMULSION INTRAVENOUS at 10:40

## 2022-06-16 RX ADMIN — Medication 10 MG: at 10:46

## 2022-06-16 RX ADMIN — PROPOFOL 30 MG: 10 INJECTION, EMULSION INTRAVENOUS at 10:58

## 2022-06-16 RX ADMIN — PROPOFOL 20 MG: 10 INJECTION, EMULSION INTRAVENOUS at 10:29

## 2022-06-16 RX ADMIN — FENTANYL CITRATE 25 MCG: 50 INJECTION, SOLUTION INTRAMUSCULAR; INTRAVENOUS at 10:22

## 2022-06-16 RX ADMIN — PROPOFOL 30 MG: 10 INJECTION, EMULSION INTRAVENOUS at 10:43

## 2022-06-16 RX ADMIN — PROPOFOL 20 MG: 10 INJECTION, EMULSION INTRAVENOUS at 10:38

## 2022-06-16 RX ADMIN — ONDANSETRON HYDROCHLORIDE 4 MG: 2 INJECTION, SOLUTION INTRAMUSCULAR; INTRAVENOUS at 12:12

## 2022-06-16 RX ADMIN — SODIUM CHLORIDE 10 MCG/MIN: 900 INJECTION, SOLUTION INTRAVENOUS at 10:43

## 2022-06-16 RX ADMIN — DEXAMETHASONE SODIUM PHOSPHATE 8 MG: 4 INJECTION, SOLUTION INTRAMUSCULAR; INTRAVENOUS at 10:28

## 2022-06-16 RX ADMIN — PROPOFOL 130 MG: 10 INJECTION, EMULSION INTRAVENOUS at 10:22

## 2022-06-16 RX ADMIN — Medication 10 MG: at 12:20

## 2022-06-16 RX ADMIN — LIDOCAINE HYDROCHLORIDE 80 MG: 20 INJECTION, SOLUTION EPIDURAL; INFILTRATION; INTRACAUDAL; PERINEURAL at 10:21

## 2022-06-16 RX ADMIN — FENTANYL CITRATE 25 MCG: 50 INJECTION, SOLUTION INTRAMUSCULAR; INTRAVENOUS at 10:29

## 2022-06-16 RX ADMIN — PROPOFOL 20 MG: 10 INJECTION, EMULSION INTRAVENOUS at 10:33

## 2022-06-16 RX ADMIN — WATER 2 G: 1 INJECTION INTRAMUSCULAR; INTRAVENOUS; SUBCUTANEOUS at 10:25

## 2022-06-16 RX ADMIN — SODIUM CHLORIDE, POTASSIUM CHLORIDE, SODIUM LACTATE AND CALCIUM CHLORIDE 25 ML/HR: 600; 310; 30; 20 INJECTION, SOLUTION INTRAVENOUS at 09:50

## 2022-06-16 RX ADMIN — PROPOFOL 130 MCG/KG/MIN: 10 INJECTION, EMULSION INTRAVENOUS at 10:29

## 2022-06-16 RX ADMIN — Medication 3 AMPULE: at 09:50

## 2022-06-16 RX ADMIN — PROPOFOL 20 MG: 10 INJECTION, EMULSION INTRAVENOUS at 10:37

## 2022-06-16 RX ADMIN — Medication 10 MG: at 12:31

## 2022-06-16 RX ADMIN — SUCCINYLCHOLINE CHLORIDE 140 MG: 20 INJECTION, SOLUTION INTRAMUSCULAR; INTRAVENOUS at 10:22

## 2022-06-16 RX ADMIN — Medication 10 MG: at 10:40

## 2022-06-16 RX ADMIN — FENTANYL CITRATE 25 MCG: 50 INJECTION, SOLUTION INTRAMUSCULAR; INTRAVENOUS at 10:43

## 2022-06-16 RX ADMIN — FENTANYL CITRATE 25 MCG: 50 INJECTION, SOLUTION INTRAMUSCULAR; INTRAVENOUS at 10:37

## 2022-06-16 NOTE — DISCHARGE INSTRUCTIONS
Operative Report    Patient: Monica Ruelas MRN: [id number]  SSN: [id number]    YOB: 1940  Age: [de-identified] y.o. Sex: male      Date of Surgery: 6/16/2022    Indications: The patient has bladder outlet obstruction secondary to Benign Prostatic Hypertrophy and has elected to proceed with a Transurethral Resection of the Prostate as definitive management. Preoperative Diagnosis: Benign prostatic hyperplasia with lower urinary tract symptoms, symptom details unspecified [N40.1]    Postoperative Diagnosis: Benign prostatic hyperplasia with lower urinary tract symptoms, symptom details unspecified [N40.1]    Surgeon(s) and Role:     * Javier Hunter MD - Primary     Anesthesia:  General    Procedure: Procedure(s):  [address] [place] [address] RESECTION OF PROSTATE    Patient Education        Laser Transurethral Resection of the Prostate (TURP): What to Expect at [facility]     Transurethral resection of the prostate (TURP) is surgery to remove prostate tissue. This is done when an overgrown prostate gland is pressing on the urethra and making it hard to urinate. After laser surgery, you will have a urinary catheter for a short time. It is a flexible plastic tube used to drain urine from your bladder when you can't urinate on your own. If it is still in place when you go home, your doctor will give you instructions on how to care for it. For several days after surgery, you may feel burning when you urinate. Your urine may be pink for 1 to 3 weeks after surgery. You also may have bladder cramps, or spasms. Your doctor may give you medicine to help control the spasms. You may still feel like you need to urinate often in the weeks after your surgery. It often takes up to 6 weeks for this to get better. After you have healed, you may have less trouble urinating.  You may have better control over starting and stopping your urine stream. And you may feel like you get more relief when you urinate. After laser TURP, most people can return to work or many of their usual tasks in a few days. But for about 2 weeks, try to avoid heavy lifting and strenuous activities that might put extra pressure on your bladder. Most people still can have erections after surgery (if they were able to have them before surgery). But they may not ejaculate when they have an orgasm. Semen may go into the bladder instead of out through the penis. This is called retrograde ejaculation. It does not hurt and is not harmful to your health. This care sheet gives you a general idea about how long it will take for you to recover. But each person recovers at a different pace. Follow the steps below to get better as quickly as possible. How can you care for yourself at home? Activity    · Rest when you feel tired.     · Be active. Walking is a good choice.     · Allow your body to heal. Don't move quickly or lift anything heavy until you are feeling better.     · Ask your doctor when you can drive again.     · Many people are able to return to work within a few days after surgery.     · Do not put anything in your rectum, such as an enema or suppository, for 4 to 6 weeks after the surgery.     · You may shower and take baths when your doctor says it is okay.     · Ask your doctor when it is okay for you to have sex. Diet    · You can eat your normal diet. If your stomach is upset, try bland, low-fat foods like plain rice, broiled chicken, toast, and yogurt.     · If your bowel movements are not regular right after surgery, try to avoid constipation and straining. Drink plenty of water. Your doctor may suggest fiber, a stool softener, or a mild laxative. Medicines    · Your doctor will tell you if and when you can restart your medicines. He or she will also give you instructions about taking any new medicines.     · If you take aspirin or some other blood thinner, be sure to talk to your doctor.  He or she will tell you if and when to start taking those medicines again. Make sure that you understand exactly what your doctor wants you to do.     · Be safe with medicines. Read and follow all instructions on the label. ? If the doctor gave you a prescription medicine for pain, take it as prescribed. ? If you are not taking a prescription pain medicine, ask your doctor if you can take an over-the-counter medicine.     · Take your antibiotics as directed. Do not stop taking them just because you feel better. You need to take the full course of antibiotics. Follow-up care is a key part of your treatment and safety. Be sure to make and go to all appointments, and call your doctor if you are having problems. It's also a good idea to know your test results and keep a list of the medicines you take. When should you call for help? Call 911  anytime you think you may need emergency care. For example, call if:    · You passed out (lost consciousness).     · You have chest pain, are short of breath, or cough up blood. Call your doctor now or seek immediate medical care if:    · You have pain that does not get better after you take pain medicine.     · You have new or more blood clots in your urine. (It is normal for the urine to be pink for a few days.)     · You can't pass urine.     · You have symptoms of a urinary tract infection. These may include:  ? Pain or burning when you urinate. ? A frequent need to urinate without being able to pass much urine. ? Pain in the flank, which is just below the rib cage and above the waist on either side of the back. ? Blood in your urine. ? A fever.     · You are sick to your stomach or can't keep down fluids.     · You have signs of a blood clot in your leg (called a deep vein thrombosis), such as:  ? Pain in your calf, back of the knee, thigh, or groin. ? Redness or swelling in your leg. Watch closely for changes in your health, and be sure to contact your doctor if you have problems.   Where can you learn more? Go to http://www.gray.com/  Enter D107 in the search box to learn more about \"Laser Transurethral Resection of the Prostate (TURP): What to Expect at Home. \"  Current as of: February 10, 2021               Content Version: 13.2  © 2006-2022 [employer]. Care instructions adapted under license by [employer] (which disclaims liability or warranty for this information). If you have questions about a medical condition or this instruction, always ask your healthcare professional. [address] any warranty or liability for your use of this information. DISCHARGE SUMMARY from Nurse    PATIENT INSTRUCTIONS:    After general anesthesia or intravenous sedation, for 24 hours or while taking prescription narcotics:  · Limit your activities  · Do not drive and operate hazardous machinery  · Do not make important personal or business decisions  · Do not drink alcoholic beverages  · If you have not urinated within 8 hours after discharge, please contact your surgeon on call. Report the following to your surgeon:  · Excessive pain, swelling, redness or odor of or around the surgical area  · Temperature over 100.5  · Nausea and vomiting lasting longer than 4 hours or if unable to take medications  · Any signs of decreased circulation or nerve impairment to extremity: change in color, persistent numbness, tingling, coldness or increase pain  · Any questions    These are general instructions for a healthy lifestyle (if applicable): No smoking/ No tobacco products/ Avoid exposure to secondhand smoke  Surgeon General's Warning:  Quitting smoking now greatly reduces serious risk to your health.     Obesity, smoking, and sedentary lifestyle greatly increases your risk for illness    A healthy diet, regular physical exercise & weight monitoring are important for maintaining a healthy lifestyle    You may be retaining fluid if you have a history of heart failure or if you experience any of the following symptoms:  Weight gain of 3 pounds or more overnight or 5 pounds in a week, increased swelling in our hands or feet or shortness of breath while lying flat in bed. Please call your doctor as soon as you notice any of these symptoms; do not wait until your next office visit. A common side effect of anesthesia following surgery is nausea and/or vomiting. In order to decrease symptoms, it is wise to avoid foods that are high in fat, greasy foods, milk products, and spicy foods for the first 24 hours. Acceptable foods for the first 24 hours following surgery include but are not limited to:    · soup  · broth  ·  toast   · crackers   · applesauce  ·  bananas   · mashed potatoes,  · soft or scrambled eggs  · oatmeal  ·  jello    It is important to eat when taking your pain medication. This will help to prevent nausea. If possible, please try to time your meals with your medications. It is very important to stay hydrated following surgery. Sip fluids frequently while awake. Avoid acidic drinks such as citrus juices and soda for 24 hours. Carbonated beverages may cause bloating and gas. Acceptable fluids include:    · water (flavor packets may add variety)  · coffee or tea (in moderation)  · Gatorade  · Rio-Aid  · apple juice  · cranberry juice    You are encouraged to cough and deep breathe every hour when awake. This will help to prevent respiratory complications following anesthesia. You may want to hug a pillow when coughing and sneezing to add additional support to the surgical area and to decrease discomfort if you had abdominal or chest surgery. If you are discharged home with support stockings, you may remove them after 24 hours. Support stockings are used to help prevent blood clots in the legs following surgery. TO PREVENT AN INFECTION      1.  [address] YOUR HANDS     To prevent infection, good handwashing is the most important thing you or your caregiver can do.  Wash your hands with soap and water or use the hand  we gave you before you touch any wounds. 2. SHOWER     Use the antibacterial soap we gave you when you take a shower.  Shower with this soap until your wounds are healed.  To reach all areas of your body, you may need someone to help you.  Dont forget to clean your belly button with every shower. 3.  USE CLEAN SHEETS     Use freshly cleaned sheets on your bed after surgery.  To keep the surgery site clean, do not allow pets to sleep with you while your wound is still healing. 4. STOP SMOKING     Stop smoking, or at least cut back on smoking     Smoking slows your healing. 5.  CONTROL YOUR BLOOD SUGAR     High blood sugars slow wound healing.  If you are diabetic, control your blood sugar levels before and after your surgery. Please take time to review all of your Home Care Instructions and Medication Information sheets provided in your discharge packet. If you have any questions, please contact your surgeon's office. Thank you. The discharge information has been reviewed with the patient and instruction recipient. The patient and instruction recipient verbalized understanding. Discharge medications reviewed with the patient and instruction recipient and appropriate educational materials and side effects teaching were provided.

## 2022-06-16 NOTE — ANESTHESIA PREPROCEDURE EVALUATION
Relevant Problems   No relevant active problems       Anesthetic History   No history of anesthetic complications       Comments: Slow to emerge     Review of Systems / Medical History  Patient summary reviewed, nursing notes reviewed and pertinent labs reviewed    Pulmonary  Within defined limits                 Neuro/Psych   Within defined limits           Cardiovascular    Hypertension              Exercise tolerance: >4 METS  Comments: LBBB   GI/Hepatic/Renal     GERD: well controlled    Renal disease: stones  Hiatal hernia     Endo/Other        Arthritis     Other Findings   Comments: Glaucoma           Physical Exam    Airway  Mallampati: II  TM Distance: 4 - 6 cm  Neck ROM: normal range of motion   Mouth opening: Normal     Cardiovascular  Regular rate and rhythm,  S1 and S2 normal,  no murmur, click, rub, or gallop  Rhythm: regular  Rate: normal         Dental  No notable dental hx       Pulmonary  Breath sounds clear to auscultation               Abdominal  GI exam deferred       Other Findings            Anesthetic Plan    ASA: 2  Anesthesia type: general and total IV anesthesia    Monitoring Plan: BIS      Induction: Intravenous  Anesthetic plan and risks discussed with: Patient

## 2022-06-16 NOTE — PROGRESS NOTES
Patient discharged to home. Iv removed. Discharge instructions, scripts, and medication education done with patient and spouse in phase 2. Hughes education/demo done with phase 2 with patient and spouse.

## 2022-06-16 NOTE — PERIOP NOTES
1242-Handoff Report from Operating Room to PACU    Report received from [address] and [address] regarding Beau Munoz. Surgeon(s):  Nancy Zazueta MD  And Procedure(s) (LRB):  PROTOUCH LASER TRANSURETHRAL RESECTION OF PROSTATE (N/A)  confirmed   with allergies and drains discussed. Anesthesia type, drugs, patient history, complications, estimated blood loss, vital signs, intake and output, and last pain medication, lines, reversal medications and temperature were reviewed.

## 2022-06-16 NOTE — ANESTHESIA POSTPROCEDURE EVALUATION
Post-Anesthesia Evaluation and Assessment    Patient: Luc Valdivia MRN: [id number]  SSN: [id number]    YOB: 1940  Age: [de-identified] y.o. Sex: male      I have evaluated the patient and they are stable and ready for discharge from the PACU. Cardiovascular Function/Vital Signs  Visit Vitals  BP (!) 137/58   Pulse 62   Temp 36.4 °C (97.5 °F)   Resp 18   Ht 6' (1.829 m)   Wt 80.1 kg (176 lb 9.4 oz)   SpO2 97%   BMI 23.95 kg/m²       Patient is status post General anesthesia for Procedure(s):  [address]. Nausea/Vomiting: None    Postoperative hydration reviewed and adequate. Pain:  Pain Scale 1: Numeric (0 - 10) (06/16/22 1326)  Pain Intensity 1: 0 (06/16/22 1326)   Managed    Neurological Status: At baseline    Mental Status, Level of Consciousness: Alert and  oriented to person, place, and time    Pulmonary Status:   O2 Device: None (Room air) (06/16/22 1326)   Adequate oxygenation and airway patent    Complications related to anesthesia: None    Post-anesthesia assessment completed. No concerns.      Signed By: Finley Angelucci, DO     June 16, 2022

## 2022-06-16 NOTE — PERIOP NOTES
TRANSFER - OUT REPORT:    Verbal report given to Juan Hurtado RN (name) on Angelia Mau  being transferred to phase2(unit) for routine post - op       Report consisted of patients Situation, Background, Assessment and   Recommendations(SBAR). Information from the following report(s) SBAR, Kardex, OR Summary, Intake/Output and MAR was reviewed with the receiving nurse. Opportunity for questions and clarification was provided. Patient transported with:   Registered Nurse   Emptied 200 ml from hayes bag, was clear pink, next output was 300ml light cherry red, no clots, evaluated by NP Riley/Urology, cleared for discharge home with office follow up for removal of hayes catheter. , VSS, transferred to phase 2

## 2022-06-16 NOTE — BRIEF OP NOTE
Brief Postoperative Note    Patient: Jose Cruz Cole  YOB: 1940  MRN: [id number]    Date of Procedure: 6/16/2022     Pre-Op Diagnosis: Benign prostatic hyperplasia with lower urinary tract symptoms, symptom details unspecified [N40.1]    Post-Op Diagnosis: Same as preoperative diagnosis.       Procedure(s):  PROTOUCH LASER TRANSURETHRAL RESECTION OF PROSTATE    Surgeon(s):  Yogi Raines MD    Surgical Assistant: None    Anesthesia: General     Estimated Blood Loss (mL): Minimal    Complications: None    Specimens: * No specimens in log *     Implants: 20 Fr Hughes    Drains: * No LDAs found *    Findings: BPH    Electronically Signed by Brandon Francisco MD on 6/16/2022 at 12:21 PM

## 2022-06-17 NOTE — OP NOTES
[address]  OPERATIVE REPORT    Name:  Sherwin Burr  MR#:  [id number]  :  1940  ACCOUNT #:  [de-identified]  DATE OF SERVICE:  2022      PREOPERATIVE DIAGNOSIS:  Benign prostatic hypertrophy. POSTOPERATIVE DIAGNOSIS:  Benign prostatic hypertrophy. PROCEDURE PERFORMED:  ProTouch laser TURP. SURGEON:  La Rothman MD    ASSISTANT:  None. ANESTHESIA:  General.    COMPLICATIONS:  none    SPECIMENS REMOVED:  none    IMPLANTS:  Hughes    ESTIMATED BLOOD LOSS:  minimal    INDICATIONS:  Symptomatic BPH. He did receive preoperative IV Ancef. PROCEDURE:  He underwent a general anesthetic and was placed in the dorsal lithotomy position. He was prepped and draped in a sterile fashion. A time-out was called confirming the planned procedure. The 21-Georgian cystoscope with a 30-degree lens and video camera was used. The anterior urethra was normal.  The posterior urethra was obstructing in a trilobar fashion with a significant vascular median lobe. The bladder was trabeculated, but otherwise normal.  The 24 laser resectoscope was then placed with visual obturator, then the side-firing ProTouch laser fiber was used mainly at 80, only at the end of the case 90 khan. See laser log for details. The mucosa in front of the ureteral orifices was marked so as to avoid those. Then the laser vaporization was done in a routine fashion taking care not to resect on to the trigone, no further distally than the verumontanum. Because of the high and median lobe, this was done in a relatively nucleation fashion with the laser being used to downsize the pieces so that it could be removed at the end of the procedure with the graspers. The graspers and the Rasheed were used for this, hemostasis was obtained with laser then confirmed visually, the end result was excellent visual result.   There was no evidence of any capsular perforation and bleeding was very subtle throughout the procedure. The scope was withdrawn and a 20-Cypriot Hughes catheter was inserted, inflated with 30 mL of sterile water. The efflux was noted to be relatively clear. The catheter was connected to a gravity drainage bag. A bow tie was applied and procedure completed. He was stable on my departure from the operative suite. Operative findings described to his wife. He will continue his dutasteride, discontinue his tamsulosin, finish the antibiotics he has at home and use Azo p.r.n. He will followup for nursing visit voiding trail in 4-5 days and see me in about 2 weeks with the bladder scan and PVR.         MD MALU [id number]  D:  06/16/2022 13:22  T:  06/16/2022 22:08  JOB #:  [id number]

## 2022-08-16 ENCOUNTER — TRANSCRIBE ORDER (OUTPATIENT)
Dept: SCHEDULING | Age: 82
End: 2022-08-16

## 2022-08-16 DIAGNOSIS — Z87.442 H/O: URINARY STONE: Primary | ICD-10-CM

## 2022-08-16 DIAGNOSIS — N20.1 CALCULUS OF URETER: Primary | ICD-10-CM

## 2022-08-16 DIAGNOSIS — Z87.440 PERSONAL HISTORY OF URINARY (TRACT) INFECTION: ICD-10-CM

## 2022-08-26 ENCOUNTER — HOSPITAL ENCOUNTER (OUTPATIENT)
Dept: CT IMAGING | Age: 82
Discharge: HOME | End: 2022-08-26
Attending: UROLOGY
Payer: MEDICARE

## 2022-08-26 DIAGNOSIS — N20.1 CALCULUS OF URETER: ICD-10-CM

## 2022-08-26 PROCEDURE — 74176 CT ABD & PELVIS W/O CONTRAST: CPT

## 2024-07-03 ENCOUNTER — APPOINTMENT (OUTPATIENT)
Facility: HOSPITAL | Age: 84
End: 2024-07-03
Payer: MEDICARE

## 2024-07-03 ENCOUNTER — HOSPITAL ENCOUNTER (EMERGENCY)
Facility: HOSPITAL | Age: 84
Discharge: HOME | End: 2024-07-03
Payer: MEDICARE

## 2024-07-03 VITALS
DIASTOLIC BLOOD PRESSURE: 80 MMHG | RESPIRATION RATE: 16 BRPM | HEART RATE: 64 BPM | TEMPERATURE: 97.8 F | SYSTOLIC BLOOD PRESSURE: 157 MMHG | WEIGHT: 177.69 LBS | OXYGEN SATURATION: 97 % | BODY MASS INDEX: 24.1 KG/M2

## 2024-07-03 DIAGNOSIS — T63.461A WASP STING, ACCIDENTAL OR UNINTENTIONAL, INITIAL ENCOUNTER: Primary | ICD-10-CM

## 2024-07-03 DIAGNOSIS — S22.42XA CLOSED FRACTURE OF MULTIPLE RIBS OF LEFT SIDE, INITIAL ENCOUNTER: ICD-10-CM
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
## 2024-07-03 PROCEDURE — 96375 TX/PRO/DX INJ NEW DRUG ADDON: CPT

## 2024-07-03 PROCEDURE — 2500000003 HC RX 250 WO HCPCS: Performed by: PHYSICIAN ASSISTANT

## 2024-07-03 PROCEDURE — 2580000003 HC RX 258: Performed by: PHYSICIAN ASSISTANT

## 2024-07-03 PROCEDURE — 71101 X-RAY EXAM UNILAT RIBS/CHEST: CPT

## 2024-07-03 PROCEDURE — 99284 EMERGENCY DEPT VISIT MOD MDM: CPT

## 2024-07-03 PROCEDURE — 6360000002 HC RX W HCPCS: Performed by: PHYSICIAN ASSISTANT

## 2024-07-03 PROCEDURE — 96374 THER/PROPH/DIAG INJ IV PUSH: CPT

## 2024-07-03 RX ORDER — DIPHENHYDRAMINE HYDROCHLORIDE 50 MG/ML
50 INJECTION INTRAMUSCULAR; INTRAVENOUS
Status: COMPLETED | OUTPATIENT
Start: 2024-07-03 | End: 2024-07-03

## 2024-07-03 RX ORDER — DEXAMETHASONE SODIUM PHOSPHATE 10 MG/ML
10 INJECTION, SOLUTION INTRAMUSCULAR; INTRAVENOUS EVERY 6 HOURS
Status: DISCONTINUED | OUTPATIENT
Start: 2024-07-03 | End: 2024-07-03 | Stop reason: HOSPADM

## 2024-07-03 RX ORDER — PREDNISONE 10 MG/1
TABLET ORAL
Qty: 21 TABLET | Refills: 0 | Status: SHIPPED | OUTPATIENT
Start: 2024-07-03 | End: 2024-07-08

## 2024-07-03 RX ORDER — OXYCODONE HYDROCHLORIDE AND ACETAMINOPHEN 5; 325 MG/1; MG/1
1 TABLET ORAL NIGHTLY PRN
Qty: 12 TABLET | Refills: 0 | Status: SHIPPED | OUTPATIENT
Start: 2024-07-03 | End: 2024-07-15

## 2024-07-03 RX ADMIN — DIPHENHYDRAMINE HYDROCHLORIDE 50 MG: 50 INJECTION INTRAMUSCULAR; INTRAVENOUS at 11:35

## 2024-07-03 RX ADMIN — DEXAMETHASONE SODIUM PHOSPHATE 10 MG: 10 INJECTION, SOLUTION INTRAMUSCULAR; INTRAVENOUS at 11:35

## 2024-07-03 RX ADMIN — FAMOTIDINE 20 MG: 10 INJECTION, SOLUTION INTRAVENOUS at 11:36

## 2024-07-03 ASSESSMENT — PAIN DESCRIPTION - LOCATION: LOCATION: FACE;EYE

## 2024-07-03 ASSESSMENT — PAIN DESCRIPTION - ORIENTATION: ORIENTATION: RIGHT

## 2024-07-03 ASSESSMENT — PAIN DESCRIPTION - DESCRIPTORS: DESCRIPTORS: ACHING

## 2024-07-03 ASSESSMENT — LIFESTYLE VARIABLES
HOW OFTEN DO YOU HAVE A DRINK CONTAINING ALCOHOL: NEVER
HOW MANY STANDARD DRINKS CONTAINING ALCOHOL DO YOU HAVE ON A TYPICAL DAY: PATIENT DOES NOT DRINK

## 2024-07-03 ASSESSMENT — PAIN - FUNCTIONAL ASSESSMENT: PAIN_FUNCTIONAL_ASSESSMENT: 0-10

## 2024-07-03 ASSESSMENT — PAIN SCALES - GENERAL: PAINLEVEL_OUTOF10: 9

## 2024-07-03 NOTE — DISCHARGE INSTRUCTIONS
Thank You!    It was a pleasure taking care of you in our Emergency Department today. We know that when you come to our Emergency Department, you are entrusting us with your health, comfort, and safety. Our physicians and nurses honor that trust, and truly appreciate the opportunity to care for you and your loved ones.      We also value your feedback. If you receive a survey about your Emergency Department experience today, please fill it out.  We care about our patients' feedback, and we listen to what you have to say.  Thank you.    CHANDRIKA Valencia  ________________________________________________________________________  I have included a copy of your lab results and/or radiologic studies from today's visit so you can have them easily available at your follow-up visit. We hope you feel better and please do not hesitate to contact the ED if you have any questions at all!      XR RIBS LEFT INCLUDE CHEST (MIN 3 VIEWS)   Final Result   1. Left rib fractures.   2. No acute cardiopulmonary disease.      Electronically signed by APOORVA BELTRÁN        The exam and treatment you received in the Emergency Department were for an urgent problem and are not intended as complete care. It is important that you follow up with a doctor, nurse practitioner, or physician assistant for ongoing care. If your symptoms become worse or you do not improve as expected and you are unable to reach your usual health care provider, you should return to the Emergency Department. We are available 24 hours a day.    Please take your discharge instructions with you when you go to your follow-up appointment.     If a prescription has been provided, please have it filled as soon as possible to prevent a delay in treatment. Read the entire medication instruction sheet provided to you by the pharmacy. If you have any questions or reservations about taking the medication due to side effects or interactions with other medications, please call your

## 2024-07-03 NOTE — ED PROVIDER NOTES
[facility] EMERGENCY DEPT  EMERGENCY DEPARTMENT ENCOUNTER       Pt Name: Lonnie Juan  MRN: [id number]  Birthdate 1940  Date of evaluation: 7/3/2024  Provider: CHANDRIKA Valencia   PCP: Harjinder Armijo MD  Note Started: 11:20 AM EDT 7/3/24     CHIEF COMPLAINT       Chief Complaint   Patient presents with    Insect Bite     Patient reports a bee sting 2 hours ago to the R side of the forehead causing swelling to the R side of the face. Patient denies allergy.    Fall     Patient ambulatory into triage complaining of L side pain since 6/21. Patient did have a fall on 6/21, reports that he tripped and fell on his driveway. Patient denies hitting head, reports he is not on blood thinners. No bruising or swelling noted to the area of pain.      HISTORY OF PRESENT ILLNESS: 1 or more elements      History From: Patient and Patient's Wife  HPI Limitations: None     Lonnie Juan is a 84 y.o. male who presents by POV with multiple complaints.     Complaint #1: Patient was outside working in his yard today and got stung by a wasp on his right upper eye. Shortly after he was unable to see.  He reports that his wife told him his eye was swelling shut.  He took Benadryl 50 mg and applied ice with little relief.  He denies prior allergic reactions to bee stings in the past.    Complaint #2: Patient reports that he stubbed his foot on 6/21/2024, which caused him to fall to the ground.  He landed on his left side and has had left chest wall pain since that time.  He reports difficulty sleeping when he lays on his left side due to pain.  There is been no treatment prior to arrival.  He is requesting x-rays of his ribs to make sure that there is no fracture.    He denies all other complaints or injuries.  There is no chest pain, palpitations, or shortness of breath.     Nursing Notes were all reviewed and agreed with or any disagreements were addressed in the HPI.     REVIEW OF SYSTEMS      Review of Systems    and other interpretive errors are inadvertently transcribed by the computer software. Please disregards these errors. Please excuse any errors that have escaped final proofreading.)       Adele Solano PA  07/06/24 1935

## 2025-03-11 ENCOUNTER — HOSPITAL ENCOUNTER (OUTPATIENT)
Facility: HOSPITAL | Age: 85
Setting detail: OUTPATIENT SURGERY
Discharge: HOME | End: 2025-03-11
Attending: INTERNAL MEDICINE | Admitting: INTERNAL MEDICINE
Payer: MEDICARE

## 2025-03-11 VITALS
OXYGEN SATURATION: 97 % | RESPIRATION RATE: 17 BRPM | HEART RATE: 62 BPM | DIASTOLIC BLOOD PRESSURE: 77 MMHG | SYSTOLIC BLOOD PRESSURE: 164 MMHG

## 2025-03-11 PROCEDURE — 2709999900 HC NON-CHARGEABLE SUPPLY: Performed by: INTERNAL MEDICINE

## 2025-03-11 PROCEDURE — 3600007502: Performed by: INTERNAL MEDICINE

## 2025-03-11 PROCEDURE — C1726 CATH, BAL DIL, NON-VASCULAR: HCPCS | Performed by: INTERNAL MEDICINE

## 2025-03-11 PROCEDURE — 3600007512: Performed by: INTERNAL MEDICINE

## 2025-03-11 RX ORDER — TRIAMCINOLONE ACETONIDE 1 MG/G
CREAM TOPICAL
COMMUNITY
Start: 2025-01-14

## 2025-03-11 ASSESSMENT — PAIN - FUNCTIONAL ASSESSMENT
PAIN_FUNCTIONAL_ASSESSMENT: NONE - DENIES PAIN
PAIN_FUNCTIONAL_ASSESSMENT: NONE - DENIES PAIN

## 2025-03-11 NOTE — PROGRESS NOTES
Rectal exam done by ASHLEY Sinclair.  Anal manometry catheter inserted into rectum.  Manometry procedure complete.  Catheter inserted into rectum.  Balloon filled with 40cc of luke warm H2O, and pt escorted to bathroom for 5 min expulsion test.  Pt was able to expel Pt tolerated procedures well.

## 2025-03-11 NOTE — DISCHARGE INSTRUCTIONS
Lonnie Juan  [id number]  1940      MANOMETRY DISCHARGE INSTRUCTION    You may resume your regular diet as tolerated.  You may resume your normal daily activities.  Call your Physician if you have any complications or questions.    Discussed with the patient and all questioned fully answered. He will call me if any problems arise.     1 = Total assistance

## 2025-06-04 ENCOUNTER — TRANSCRIBE ORDERS (OUTPATIENT)
Facility: HOSPITAL | Age: 85
End: 2025-06-04

## 2025-06-04 DIAGNOSIS — R63.4 WEIGHT LOSS: ICD-10-CM

## 2025-06-04 DIAGNOSIS — R63.0 POOR APPETITE: ICD-10-CM

## 2025-06-04 DIAGNOSIS — K63.8219 SMALL INTESTINAL BACTERIAL OVERGROWTH (SIBO): ICD-10-CM

## 2025-06-04 DIAGNOSIS — R10.9 ABDOMINAL PAIN, UNSPECIFIED ABDOMINAL LOCATION: ICD-10-CM

## 2025-06-04 DIAGNOSIS — R14.0 BLOATING: Primary | ICD-10-CM

## 2025-06-17 ENCOUNTER — HOSPITAL ENCOUNTER (OUTPATIENT)
Facility: HOSPITAL | Age: 85
Setting detail: OUTPATIENT SURGERY
Discharge: HOME | End: 2025-06-17
Attending: INTERNAL MEDICINE | Admitting: INTERNAL MEDICINE
Payer: MEDICARE

## 2025-06-17 ENCOUNTER — HOSPITAL ENCOUNTER (OUTPATIENT)
Facility: HOSPITAL | Age: 85
Discharge: HOME | End: 2025-06-20
Payer: MEDICARE

## 2025-06-17 ENCOUNTER — ANESTHESIA (OUTPATIENT)
Facility: HOSPITAL | Age: 85
End: 2025-06-17
Payer: MEDICARE

## 2025-06-17 ENCOUNTER — ANESTHESIA EVENT (OUTPATIENT)
Facility: HOSPITAL | Age: 85
End: 2025-06-17
Payer: MEDICARE

## 2025-06-17 VITALS
TEMPERATURE: 97.6 F | BODY MASS INDEX: 24.22 KG/M2 | HEIGHT: 71 IN | DIASTOLIC BLOOD PRESSURE: 64 MMHG | WEIGHT: 173 LBS | RESPIRATION RATE: 19 BRPM | OXYGEN SATURATION: 98 % | HEART RATE: 48 BPM | SYSTOLIC BLOOD PRESSURE: 146 MMHG

## 2025-06-17 DIAGNOSIS — R14.0 BLOATING: ICD-10-CM

## 2025-06-17 DIAGNOSIS — R10.9 ABDOMINAL PAIN, UNSPECIFIED ABDOMINAL LOCATION: ICD-10-CM

## 2025-06-17 DIAGNOSIS — R63.4 WEIGHT LOSS: ICD-10-CM

## 2025-06-17 DIAGNOSIS — K63.8219 SMALL INTESTINAL BACTERIAL OVERGROWTH (SIBO): ICD-10-CM

## 2025-06-17 DIAGNOSIS — R63.0 POOR APPETITE: ICD-10-CM

## 2025-06-17 LAB — CREAT BLD-MCNC: 1.2 MG/DL (ref 0.6–1.3)

## 2025-06-17 PROCEDURE — 3700000001 HC ADD 15 MINUTES (ANESTHESIA): Performed by: INTERNAL MEDICINE

## 2025-06-17 PROCEDURE — 2580000003 HC RX 258: Performed by: INTERNAL MEDICINE

## 2025-06-17 PROCEDURE — 6360000004 HC RX CONTRAST MEDICATION: Performed by: FAMILY MEDICINE

## 2025-06-17 PROCEDURE — 88305 TISSUE EXAM BY PATHOLOGIST: CPT

## 2025-06-17 PROCEDURE — 3700000000 HC ANESTHESIA ATTENDED CARE: Performed by: INTERNAL MEDICINE

## 2025-06-17 PROCEDURE — 3600007512: Performed by: INTERNAL MEDICINE

## 2025-06-17 PROCEDURE — 7100000011 HC PHASE II RECOVERY - ADDTL 15 MIN: Performed by: INTERNAL MEDICINE

## 2025-06-17 PROCEDURE — 82565 ASSAY OF CREATININE: CPT

## 2025-06-17 PROCEDURE — 2580000003 HC RX 258: Performed by: NURSE ANESTHETIST, CERTIFIED REGISTERED

## 2025-06-17 PROCEDURE — 74177 CT ABD & PELVIS W/CONTRAST: CPT

## 2025-06-17 PROCEDURE — 6360000002 HC RX W HCPCS: Performed by: NURSE ANESTHETIST, CERTIFIED REGISTERED

## 2025-06-17 PROCEDURE — 2709999900 HC NON-CHARGEABLE SUPPLY: Performed by: INTERNAL MEDICINE

## 2025-06-17 PROCEDURE — 7100000010 HC PHASE II RECOVERY - FIRST 15 MIN: Performed by: INTERNAL MEDICINE

## 2025-06-17 PROCEDURE — 88342 IMHCHEM/IMCYTCHM 1ST ANTB: CPT

## 2025-06-17 PROCEDURE — 82657 ENZYME CELL ACTIVITY: CPT

## 2025-06-17 PROCEDURE — 3600007502: Performed by: INTERNAL MEDICINE

## 2025-06-17 RX ORDER — IOPAMIDOL 755 MG/ML
100 INJECTION, SOLUTION INTRAVASCULAR
Status: COMPLETED | OUTPATIENT
Start: 2025-06-17 | End: 2025-06-17

## 2025-06-17 RX ORDER — 0.9 % SODIUM CHLORIDE 0.9 %
INTRAVENOUS SOLUTION INTRAVENOUS
Status: DISCONTINUED | OUTPATIENT
Start: 2025-06-17 | End: 2025-06-17 | Stop reason: SDUPTHER

## 2025-06-17 RX ORDER — SODIUM CHLORIDE 9 MG/ML
INJECTION, SOLUTION INTRAVENOUS PRN
Status: DISCONTINUED | OUTPATIENT
Start: 2025-06-17 | End: 2025-06-17 | Stop reason: HOSPADM

## 2025-06-17 RX ORDER — SODIUM CHLORIDE 9 MG/ML
INJECTION, SOLUTION INTRAVENOUS CONTINUOUS
Status: DISCONTINUED | OUTPATIENT
Start: 2025-06-17 | End: 2025-06-17 | Stop reason: HOSPADM

## 2025-06-17 RX ORDER — SODIUM CHLORIDE 0.9 % (FLUSH) 0.9 %
5-40 SYRINGE (ML) INJECTION EVERY 12 HOURS SCHEDULED
Status: DISCONTINUED | OUTPATIENT
Start: 2025-06-17 | End: 2025-06-17 | Stop reason: HOSPADM

## 2025-06-17 RX ORDER — SODIUM CHLORIDE 0.9 % (FLUSH) 0.9 %
5-40 SYRINGE (ML) INJECTION PRN
Status: DISCONTINUED | OUTPATIENT
Start: 2025-06-17 | End: 2025-06-17 | Stop reason: HOSPADM

## 2025-06-17 RX ADMIN — SODIUM CHLORIDE: 0.9 INJECTION, SOLUTION INTRAVENOUS at 08:03

## 2025-06-17 RX ADMIN — LIDOCAINE HYDROCHLORIDE 100 MG: 20 INJECTION, SOLUTION EPIDURAL; INFILTRATION; INTRACAUDAL; PERINEURAL at 09:02

## 2025-06-17 RX ADMIN — IOPAMIDOL 100 ML: 755 INJECTION, SOLUTION INTRAVENOUS at 11:06

## 2025-06-17 RX ADMIN — PROPOFOL 50 MG: 10 INJECTION, EMULSION INTRAVENOUS at 09:06

## 2025-06-17 RX ADMIN — SODIUM CHLORIDE 150 ML: 9 INJECTION, SOLUTION INTRAVENOUS at 09:13

## 2025-06-17 RX ADMIN — PROPOFOL 100 MG: 10 INJECTION, EMULSION INTRAVENOUS at 09:02

## 2025-06-17 ASSESSMENT — PAIN - FUNCTIONAL ASSESSMENT: PAIN_FUNCTIONAL_ASSESSMENT: NONE - DENIES PAIN

## 2025-06-17 NOTE — ANESTHESIA PRE PROCEDURE
Department of Anesthesiology  Preprocedure Note       Name:  Lonnie Juan   Age:  85 y.o.  :  1940                                          MRN:  [id number]         Date:  2025      Surgeon: Surgeon(s):  John Kim MD    Procedure: Procedure(s):  ESOPHAGOGASTRODUODENOSCOPY    Medications prior to admission:   Prior to Admission medications    Medication Sig Start Date End Date Taking? Authorizing Provider   vitamin D (CHOLECALCIFEROL) 25 MCG (1000 UT) TABS tablet Take 1 tablet by mouth daily   Yes Provider, MD Mayuri   amLODIPine (NORVASC) 2.5 MG tablet Take 1 tablet by mouth daily   Yes Automatic Reconciliation, Ar   lisinopril (PRINIVIL;ZESTRIL) 40 MG tablet Take 1 tablet by mouth daily   Yes Automatic Reconciliation, Ar   Lutein 20 MG TABS Take 20 mg by mouth daily   Yes Automatic Reconciliation, Ar   triamcinolone (KENALOG) 0.1 % cream APPLY TOPICALLY TO THE WAISTLINE AND BACK TWICE DAILY AS NEEDED FOR FLARES 25   ProviderMayuri MD   dutasteride (AVODART) 0.5 MG capsule Take 0.5 mg by mouth daily  Patient not taking: Reported on 3/11/2025    Automatic Reconciliation, Ar   loteprednol (LOTEMAX) 0.5 % ophthalmic suspension Apply 1 drop to eye 4 times daily    Automatic Reconciliation, Ar   melatonin 5 MG TABS tablet Take 5 mg by mouth    Automatic Reconciliation, Ar   pilocarpine (PILOCAR) 2 % ophthalmic solution Apply 1 drop to eye 4 times daily    Automatic Reconciliation, Ar       Current medications:    Current Facility-Administered Medications   Medication Dose Route Frequency Provider Last Rate Last Admin   • 0.9 % sodium chloride infusion   IntraVENous Continuous John Kim MD         Facility-Administered Medications Ordered in Other Encounters   Medication Dose Route Frequency Provider Last Rate Last Admin   • iopamidol (ISOVUE-370) 76 % injection 100 mL  100 mL IntraVENous ONCE PRN Harjinder Armijo MD       • barium sulfate (READI-CAT 2) 2 %

## 2025-06-17 NOTE — DISCHARGE INSTRUCTIONS
Lonnie Juan  [id number]  1940    It was my pleasure seeing you for your procedure.  You will also receive a summary report with the findings from this procedure and any further recommendations.  If you had polyps removed or biopsies taken during your procedure, you will receive a separate letter from me within the next 2 weeks.  If you don't receive this letter or if you have any questions, please call my office [phone].     Please take note of the post procedure instructions listed below.    Best Wishes,    Dr. Kim      CARE FOLLOWING YOUR PROCEDURE    These instructions give you information on caring for yourself after your procedure. Call your doctor if you have any problems or questions after your procedure.    GET HELP RIGHT AWAY AND SEEK IMMEDIATE MEDICAL CARE IF:  You have more than a spotting of blood in your stool.  You pass clumps of tissue (blood clots) or fill the toilet with blood.  Your belly is painfully swollen or puffy (abdominal distention).  You throw up (vomit).  You have a fever.  You have redness, pain or swelling at the IV site that last greater than two days.  You have abdominal pain or discomfort that is severe or gets worse throughout the day.    For 24 hours after general anesthesia or intravenous analgesia / sedation  you may experience:  Drowsiness, dizziness, sleepiness, or confusion  Difficulty remembering or delayed reaction times  Difficulty with your balance, especially while walking, move slowly and carefully, do not make sudden position changes  Difficulty focusing or blurred vision    HOME CARE  Walk if you have belly cramping or gas.  Walking will help get rid of the air and reduce the bloated feeling in your belly (abdomen).  Your IV site (where you received drugs) may be tender to touch.  Place warm towels on the site; keep your arm up on two pillows if you have any swelling or soreness in the area.  You may

## 2025-06-17 NOTE — OP NOTE
NAME:  Lonnie Juan   :   1940   MRN:   [id number]     Date/Time:  2025 9:15 AM    Esophagogastroduodenoscopy (EGD) Procedure Note    :  John Kim MD    Staff: Circulator: Jennie Francisco RN  Endoscopy Technician: Kandice Parisi    Referring Provider:  Harjinder Armijo MD    Anethesia/Sedation:  MAC anesthesia Propofol    Procedure Details   After infomed consent was obtained for the procedure, with all risks and benefits of procedure explained the patient was taken to the endoscopy suite and placed in the left lateral decubitus position.  Following sequential administration of sedation as per above, the gastroscope was inserted into the mouth and advanced under direct vision to third portion of the duodenum.  A careful inspection was made as the gastroscope was withdrawn, including a retroflexed view of the proximal stomach; findings and interventions are described below.      Findings:  Esophagus: Normal esophagus. EGJ at 40cm and regular with intact GEFV on gastric retroflexion  Stomach:  - patent pylorus  - Normal stomach, biopsied cold forceps with minimal bleeding antrum, incisura, body, cardia, to r/o H pylori.  Duodenum/jejunum:  - normal mucosa and vascular pattern, biopsied cold forceps to r/o celiac  - D3 is normal, biopsied cold forceps x8 with minimal bleeding into dry container on ice for disaccharidase enzyme activity evaluation           Complications: None.     EBL:  minimal    Impression:    See findings above    Recommendations:   - Await pathology. You should receive a letter within 2 weeks.   - Resume normal medications.  - attempted to call Malgor[facility] [phone] no answer no personalized  box    Discharge disposition:  Home in the company of  when able to ambulate    John Kim MD

## 2025-06-17 NOTE — H&P
use: Never      Family History   Problem Relation Age of Onset    Lung Disease Father     No Known Problems Mother        Medications:   Prior to Admission medications    Medication Sig Start Date End Date Taking? Authorizing Provider   vitamin D (CHOLECALCIFEROL) 25 MCG (1000 UT) TABS tablet Take 1 tablet by mouth daily   Yes Provider, MD Mayuri   amLODIPine (NORVASC) 2.5 MG tablet Take 1 tablet by mouth daily   Yes Automatic Reconciliation, Ar   lisinopril (PRINIVIL;ZESTRIL) 40 MG tablet Take 1 tablet by mouth daily   Yes Automatic Reconciliation, Ar   Lutein 20 MG TABS Take 20 mg by mouth daily   Yes Automatic Reconciliation, Ar   triamcinolone (KENALOG) 0.1 % cream APPLY TOPICALLY TO THE WAISTLINE AND BACK TWICE DAILY AS NEEDED FOR FLARES 1/14/25   ProviderMayuri MD   dutasteride (AVODART) 0.5 MG capsule Take 0.5 mg by mouth daily  Patient not taking: Reported on 3/11/2025    Automatic Reconciliation, Ar   loteprednol (LOTEMAX) 0.5 % ophthalmic suspension Apply 1 drop to eye 4 times daily    Automatic Reconciliation, Ar   melatonin 5 MG TABS tablet Take 5 mg by mouth    Automatic Reconciliation, Ar   pilocarpine (PILOCAR) 2 % ophthalmic solution Apply 1 drop to eye 4 times daily    Automatic Reconciliation, Ar       Physical Exam:   General: no distress   HEENT: Head: Normocephalic, no lesions, without obvious abnormality.   Heart: rate normal, no leg edema    Lungs: symmetric chest rise, no work of breathing   Abdominal:  soft, NT, ND   Neurological: Grossly normal   Extremities: extremities normal, atraumatic, no cyanosis or edema     Plan of Care/Planned Procedure: egd  The heart, lungs and mental status were satisfactory for the administration of MAC sedation and for the procedure by the Anesthesiology team.      Informed consent was obtained for the procedure, including sedation.  Risks of perforation, hemorrhage, adverse drug reaction, and aspiration were discussed.  The risks, benefits and

## 2025-06-17 NOTE — PROGRESS NOTES
Endoscopy Case End Note:    9015:  Procedure scope was pre-cleaned, per protocol, at bedside by LUPE HEREDIA      0916:  Report received from anesthesia - LUPE Mccallum CRNA.  See anesthesia flowsheet for intra-procedure vital signs and events.

## 2025-06-17 NOTE — PROGRESS NOTES
ARRIVAL INFORMATION:  Verified patient name and date of birth, scheduled procedure, and informed consent.     Belongings with patient include:  Clothing,Hearing Aides bilateral        GI FOCUSED ASSESSMENT:  Neuro: Awake, alert, oriented x4  Respiratory: even and unlabored   GI: soft and non-distended      Education:Reviewed general discharge instructions and  information.

## 2025-06-17 NOTE — ANESTHESIA POSTPROCEDURE EVALUATION
Department of Anesthesiology  Postprocedure Note    Patient: Lonnie Juan  MRN: [id number]  YOB: 1940  Date of evaluation: 6/17/2025    Procedure Summary       Date: 06/17/25 Room / Location: [facility] ENDO 04 / [facility] ENDOSCOPY    Anesthesia Start: 0901 Anesthesia Stop: 0914    Procedure: ESOPHAGOGASTRODUODENOSCOPY BIOPSY (Upper GI Region) Diagnosis:       Abdominal pain, unspecified abdominal location      Unintentional weight loss      Bloating      (Abdominal pain, unspecified abdominal location [R10.9])    Surgeons: John Kim MD Responsible Provider: Sagar Eaton MD    Anesthesia Type: MAC ASA Status: 2            Anesthesia Type: MAC    Nathen Phase I: Nathen Score: 10    Nathen Phase II:      Anesthesia Post Evaluation    Patient location during evaluation: PACU  Patient participation: complete - patient participated  Level of consciousness: awake  Airway patency: patent  Nausea & Vomiting: no vomiting  Cardiovascular status: hemodynamically stable  Respiratory status: acceptable  Hydration status: euvolemic    No notable events documented.

## 2025-06-26 LAB
DIGESTIVE ENZYMES: NORMAL
GLUCOAMYLASE: 19.6
LACTASE: 13.3
PALATINASE: 3.1
SUCRASE: 17.6

## 2025-07-03 ENCOUNTER — TRANSCRIBE ORDERS (OUTPATIENT)
Facility: HOSPITAL | Age: 85
End: 2025-07-03

## 2025-07-03 ENCOUNTER — HOSPITAL ENCOUNTER (OUTPATIENT)
Facility: HOSPITAL | Age: 85
Discharge: HOME | End: 2025-07-03
Payer: MEDICARE

## 2025-07-03 DIAGNOSIS — R10.9 ABDOMINAL PAIN, UNSPECIFIED ABDOMINAL LOCATION: Primary | ICD-10-CM

## 2025-07-03 DIAGNOSIS — R14.0 BLOATING: ICD-10-CM

## 2025-07-03 DIAGNOSIS — R63.0 POOR APPETITE: ICD-10-CM

## 2025-07-03 DIAGNOSIS — R63.4 WEIGHT LOSS: ICD-10-CM

## 2025-07-03 DIAGNOSIS — R15.2 FECAL URGENCY: ICD-10-CM

## 2025-07-03 DIAGNOSIS — R10.9 ABDOMINAL PAIN, UNSPECIFIED ABDOMINAL LOCATION: ICD-10-CM

## 2025-07-03 PROCEDURE — 74018 RADEX ABDOMEN 1 VIEW: CPT

## (undated) DEVICE — CATHETER,URETHRAL,REDRUBBER,STRL,14FR: Brand: MEDLINE INDUSTRIES, INC.

## (undated) DEVICE — CATHETER IV 22GA L1IN OD0.8382-0.9144MM ID0.6096-0.6858MM 382523

## (undated) DEVICE — DRAINBAG,ANTI-REFLUX TOWER,L/F,2000ML,LL: Brand: MEDLINE

## (undated) DEVICE — IV START KIT: Brand: MEDLINE

## (undated) DEVICE — CYSTO MRMC: Brand: MEDLINE INDUSTRIES, INC.

## (undated) DEVICE — BITEBLOCK 54FR W/ DENT RIM BLOX

## (undated) DEVICE — ENDOSCOPIC KIT COMPLIANCE ENDOKIT

## (undated) DEVICE — COVIDIEN KENDALL DL DISPOSABLE 3 LEAD SY: Brand: MEDLINE RENEWAL

## (undated) DEVICE — FORCEPS BX PED L160CM JAW DIA1.8MM WRK CHN 2MM W/ NDL DISP

## (undated) DEVICE — GLOVE ORANGE PI 7 1/2   MSG9075

## (undated) DEVICE — DEVICE SECUREMENT 1/32IN POLYETH FOAM F ANCHR URIN CATH

## (undated) DEVICE — SHEATH CATH ANORECT MNOMTR

## (undated) DEVICE — CUFF BLD PRSS AD CLTH SGL TB W/ BAYNT CONN ROUNDED CORNER

## (undated) DEVICE — CATHETER BLLN DIL 26X48 MMX60 CM ANORECT BAROSTAT

## (undated) DEVICE — TUBING, SUCTION, 1/4" X 10', STRAIGHT: Brand: MEDLINE

## (undated) DEVICE — SET GRAV CK VLV NEEDLESS ST 3 GANGED 4WAY STPCOCK HI FLO 10

## (undated) DEVICE — CONTAINER SPEC 20 ML LID NEUT BUFF FORMALIN 10 % POLYPR STS

## (undated) DEVICE — MARKER,SKIN,WI/RULER AND LABELS: Brand: MEDLINE

## (undated) DEVICE — SOLUTION IRRIG 3000ML 0.9% SOD CHL USP UROMATIC PLAS CONT

## (undated) DEVICE — GOWN,SIRUS,NONRNF,SETINSLV,XL,20/CS: Brand: MEDLINE

## (undated) DEVICE — 4-PORT MANIFOLD: Brand: NEPTUNE 2